# Patient Record
Sex: MALE | Race: WHITE | NOT HISPANIC OR LATINO | Employment: FULL TIME | URBAN - METROPOLITAN AREA
[De-identification: names, ages, dates, MRNs, and addresses within clinical notes are randomized per-mention and may not be internally consistent; named-entity substitution may affect disease eponyms.]

---

## 2022-06-06 ENCOUNTER — OFFICE VISIT (OUTPATIENT)
Dept: INTERNAL MEDICINE | Facility: CLINIC | Age: 52
End: 2022-06-06
Payer: COMMERCIAL

## 2022-06-06 VITALS
HEIGHT: 72 IN | BODY MASS INDEX: 35.48 KG/M2 | WEIGHT: 261.94 LBS | OXYGEN SATURATION: 96 % | TEMPERATURE: 97 F | HEART RATE: 81 BPM | SYSTOLIC BLOOD PRESSURE: 150 MMHG | DIASTOLIC BLOOD PRESSURE: 100 MMHG

## 2022-06-06 DIAGNOSIS — I10 PRIMARY HYPERTENSION: ICD-10-CM

## 2022-06-06 DIAGNOSIS — Z00.00 ROUTINE GENERAL MEDICAL EXAMINATION AT HEALTH CARE FACILITY: ICD-10-CM

## 2022-06-06 DIAGNOSIS — Z12.11 COLON CANCER SCREENING: ICD-10-CM

## 2022-06-06 DIAGNOSIS — M54.50 ACUTE BILATERAL LOW BACK PAIN WITHOUT SCIATICA: ICD-10-CM

## 2022-06-06 PROBLEM — E29.1 HYPOGONADISM IN MALE: Status: ACTIVE | Noted: 2022-06-06

## 2022-06-06 PROCEDURE — 3008F PR BODY MASS INDEX (BMI) DOCUMENTED: ICD-10-PCS | Mod: CPTII,S$GLB,, | Performed by: INTERNAL MEDICINE

## 2022-06-06 PROCEDURE — 3080F PR MOST RECENT DIASTOLIC BLOOD PRESSURE >= 90 MM HG: ICD-10-PCS | Mod: CPTII,S$GLB,, | Performed by: INTERNAL MEDICINE

## 2022-06-06 PROCEDURE — 1160F PR REVIEW ALL MEDS BY PRESCRIBER/CLIN PHARMACIST DOCUMENTED: ICD-10-PCS | Mod: CPTII,S$GLB,, | Performed by: INTERNAL MEDICINE

## 2022-06-06 PROCEDURE — 1159F MED LIST DOCD IN RCRD: CPT | Mod: CPTII,S$GLB,, | Performed by: INTERNAL MEDICINE

## 2022-06-06 PROCEDURE — 1160F RVW MEDS BY RX/DR IN RCRD: CPT | Mod: CPTII,S$GLB,, | Performed by: INTERNAL MEDICINE

## 2022-06-06 PROCEDURE — 3008F BODY MASS INDEX DOCD: CPT | Mod: CPTII,S$GLB,, | Performed by: INTERNAL MEDICINE

## 2022-06-06 PROCEDURE — 3077F PR MOST RECENT SYSTOLIC BLOOD PRESSURE >= 140 MM HG: ICD-10-PCS | Mod: CPTII,S$GLB,, | Performed by: INTERNAL MEDICINE

## 2022-06-06 PROCEDURE — 99999 PR PBB SHADOW E&M-NEW PATIENT-LVL IV: CPT | Mod: PBBFAC,,, | Performed by: INTERNAL MEDICINE

## 2022-06-06 PROCEDURE — 99999 PR PBB SHADOW E&M-NEW PATIENT-LVL IV: ICD-10-PCS | Mod: PBBFAC,,, | Performed by: INTERNAL MEDICINE

## 2022-06-06 PROCEDURE — 1159F PR MEDICATION LIST DOCUMENTED IN MEDICAL RECORD: ICD-10-PCS | Mod: CPTII,S$GLB,, | Performed by: INTERNAL MEDICINE

## 2022-06-06 PROCEDURE — 3080F DIAST BP >= 90 MM HG: CPT | Mod: CPTII,S$GLB,, | Performed by: INTERNAL MEDICINE

## 2022-06-06 PROCEDURE — 99386 PREV VISIT NEW AGE 40-64: CPT | Mod: S$GLB,,, | Performed by: INTERNAL MEDICINE

## 2022-06-06 PROCEDURE — 3077F SYST BP >= 140 MM HG: CPT | Mod: CPTII,S$GLB,, | Performed by: INTERNAL MEDICINE

## 2022-06-06 PROCEDURE — 99386 PR PREVENTIVE VISIT,NEW,40-64: ICD-10-PCS | Mod: S$GLB,,, | Performed by: INTERNAL MEDICINE

## 2022-06-06 RX ORDER — BACLOFEN 10 MG/1
10 TABLET ORAL NIGHTLY
Qty: 10 TABLET | Refills: 0 | Status: SHIPPED | OUTPATIENT
Start: 2022-06-06 | End: 2023-09-15

## 2022-06-06 RX ORDER — LOSARTAN POTASSIUM AND HYDROCHLOROTHIAZIDE 12.5; 5 MG/1; MG/1
1 TABLET ORAL DAILY
Qty: 90 TABLET | Refills: 3 | Status: SHIPPED | OUTPATIENT
Start: 2022-06-06 | End: 2022-06-13 | Stop reason: ALTCHOICE

## 2022-06-06 RX ORDER — TESTOSTERONE ENANTHATE 200 MG/ML
VIAL (ML) INTRAMUSCULAR
COMMUNITY
End: 2022-10-14

## 2022-06-06 RX ORDER — MELOXICAM 15 MG/1
15 TABLET ORAL DAILY
Qty: 30 TABLET | Refills: 0 | Status: SHIPPED | OUTPATIENT
Start: 2022-06-06 | End: 2022-07-06

## 2022-06-06 NOTE — PROGRESS NOTES
Subjective:       Patient ID: Marcus Peterson is a 51 y.o. male.    Chief Complaint: Establish Care    HPI Patient is a 51 year a is new patient.  He comes in today with history of hypertension and some low testosterone issues.  He states that he has a history of hypertension he was on medication for that with time but he is not take it the medication in quite a while.  He has noted his blood pressure may be running little bit.  He is going to be having some work done on his right eye by ophthalmologist in the near future and wanted make sure he got his blood pressure under control.    He states this morning when he was in the shower he will reach for a bar soap any twisted funny and his lower back got tweaked a little bit.  He states has given him some discomfort at this point which is likely also contributing to his elevation of his blood pressure today.  He describes lower back discomfort both sides in the lumbar area.  It does not radiate    Review of Systems   Constitutional: Negative for fever and unexpected weight change.   HENT: Negative for hearing loss, postnasal drip and rhinorrhea.    Eyes: Negative for pain and visual disturbance.   Respiratory: Negative for cough, shortness of breath and wheezing.    Cardiovascular: Negative for chest pain and palpitations.   Gastrointestinal: Negative for constipation, diarrhea, nausea and vomiting.   Genitourinary: Negative for dysuria and hematuria.   Musculoskeletal: Positive for back pain. Negative for arthralgias, myalgias and neck stiffness.   Skin: Negative for pallor and rash.   Neurological: Negative for seizures, syncope and headaches.   Hematological: Negative for adenopathy.   Psychiatric/Behavioral: Negative for dysphoric mood. The patient is not nervous/anxious.        Objective:   BP (!) 150/100   Pulse 81   Temp 96.8 °F (36 °C) (Temporal)   Ht 6' (1.829 m)   Wt 118.8 kg (261 lb 14.5 oz)   SpO2 96%   BMI 35.52 kg/m²      Physical Exam  Vitals  reviewed.   Constitutional:       General: He is not in acute distress.     Appearance: He is well-developed.   HENT:      Head: Normocephalic and atraumatic.      Right Ear: Tympanic membrane and ear canal normal.      Left Ear: Tympanic membrane and ear canal normal.   Eyes:      Pupils: Pupils are equal, round, and reactive to light.   Neck:      Thyroid: No thyromegaly.      Vascular: No JVD.   Cardiovascular:      Rate and Rhythm: Normal rate and regular rhythm.      Heart sounds: Normal heart sounds. No murmur heard.    No friction rub. No gallop.   Pulmonary:      Effort: Pulmonary effort is normal.      Breath sounds: Normal breath sounds. No wheezing or rales.   Abdominal:      General: Bowel sounds are normal. There is no distension.      Palpations: Abdomen is soft.      Tenderness: There is no abdominal tenderness. There is no guarding or rebound.   Musculoskeletal:         General: Normal range of motion.      Cervical back: Normal range of motion and neck supple.   Lymphadenopathy:      Cervical: No cervical adenopathy.   Skin:     General: Skin is warm and dry.      Findings: No rash.   Neurological:      General: No focal deficit present.      Mental Status: He is alert and oriented to person, place, and time.      Cranial Nerves: No cranial nerve deficit.      Deep Tendon Reflexes: Reflexes are normal and symmetric.   Psychiatric:         Mood and Affect: Mood normal.         Judgment: Judgment normal.         No visits with results within 2 Week(s) from this visit.   Latest known visit with results is:   No results found for any previous visit.       Assessment:       1. Routine general medical examination at [facility] facility    2. Primary hypertension    3. Acute bilateral low back pain without sciatica    4. Colon cancer screening        Plan:   No problem-specific Assessment & Plan notes found for this encounter.    Routine general medical examination at [facility]  facility  Comments:  Focus on good health habits, low salt, limit caffeine, continue regular exercise  Orders:  -     CBC Auto Differential; Future; Expected date: 06/06/2022  -     Comprehensive Metabolic Panel; Future; Expected date: 06/06/2022  -     Lipid Panel; Future; Expected date: 06/06/2022  -     PSA, Screening; Future; Expected date: 06/06/2022    Primary hypertension  Comments:  start losartan/hctz 50/12.5 once daily  Orders:  -     losartan-hydrochlorothiazide 50-12.5 mg (HYZAAR) 50-12.5 mg per tablet; Take 1 tablet by mouth once daily.  Dispense: 90 tablet; Refill: 3    Acute bilateral low back pain without sciatica  -     baclofen (LIORESAL) 10 MG tablet; Take 1 tablet (10 mg total) by mouth every evening. for 10 days  Dispense: 10 tablet; Refill: 0  -     meloxicam (MOBIC) 15 MG tablet; Take 1 tablet (15 mg total) by mouth once daily.  Dispense: 30 tablet; Refill: 0    Colon cancer screening  -     Ambulatory referral/consult to Endo Procedure ; Future; Expected date: 06/07/2022          Follow up in about 1 week (around 6/13/2022) for PreOp, with Rush Santo.

## 2022-06-10 ENCOUNTER — PATIENT MESSAGE (OUTPATIENT)
Dept: ADMINISTRATIVE | Facility: HOSPITAL | Age: 52
End: 2022-06-10
Payer: COMMERCIAL

## 2022-06-13 ENCOUNTER — LAB VISIT (OUTPATIENT)
Dept: LAB | Facility: HOSPITAL | Age: 52
End: 2022-06-13
Attending: INTERNAL MEDICINE
Payer: COMMERCIAL

## 2022-06-13 ENCOUNTER — OFFICE VISIT (OUTPATIENT)
Dept: INTERNAL MEDICINE | Facility: CLINIC | Age: 52
End: 2022-06-13
Payer: COMMERCIAL

## 2022-06-13 VITALS
DIASTOLIC BLOOD PRESSURE: 100 MMHG | SYSTOLIC BLOOD PRESSURE: 164 MMHG | BODY MASS INDEX: 34.34 KG/M2 | TEMPERATURE: 97 F | HEIGHT: 72 IN | HEART RATE: 78 BPM | WEIGHT: 253.5 LBS

## 2022-06-13 DIAGNOSIS — Z00.00 ROUTINE GENERAL MEDICAL EXAMINATION AT HEALTH CARE FACILITY: ICD-10-CM

## 2022-06-13 DIAGNOSIS — I10 PRIMARY HYPERTENSION: Primary | ICD-10-CM

## 2022-06-13 LAB
ALBUMIN SERPL BCP-MCNC: 3.9 G/DL (ref 3.5–5.2)
ALP SERPL-CCNC: 65 U/L (ref 55–135)
ALT SERPL W/O P-5'-P-CCNC: 84 U/L (ref 10–44)
ANION GAP SERPL CALC-SCNC: 13 MMOL/L (ref 8–16)
AST SERPL-CCNC: 40 U/L (ref 10–40)
BASOPHILS # BLD AUTO: 0.11 K/UL (ref 0–0.2)
BASOPHILS NFR BLD: 1.6 % (ref 0–1.9)
BILIRUB SERPL-MCNC: 1.3 MG/DL (ref 0.1–1)
BUN SERPL-MCNC: 40 MG/DL (ref 6–20)
CALCIUM SERPL-MCNC: 9.3 MG/DL (ref 8.7–10.5)
CHLORIDE SERPL-SCNC: 101 MMOL/L (ref 95–110)
CHOLEST SERPL-MCNC: 215 MG/DL (ref 120–199)
CHOLEST/HDLC SERPL: 10.8 {RATIO} (ref 2–5)
CO2 SERPL-SCNC: 27 MMOL/L (ref 23–29)
COMPLEXED PSA SERPL-MCNC: 2.4 NG/ML (ref 0–4)
CREAT SERPL-MCNC: 1.6 MG/DL (ref 0.5–1.4)
DIFFERENTIAL METHOD: ABNORMAL
EOSINOPHIL # BLD AUTO: 0.2 K/UL (ref 0–0.5)
EOSINOPHIL NFR BLD: 2.4 % (ref 0–8)
ERYTHROCYTE [DISTWIDTH] IN BLOOD BY AUTOMATED COUNT: 12.9 % (ref 11.5–14.5)
EST. GFR  (AFRICAN AMERICAN): 57.2 ML/MIN/1.73 M^2
EST. GFR  (NON AFRICAN AMERICAN): 49.5 ML/MIN/1.73 M^2
GLUCOSE SERPL-MCNC: 96 MG/DL (ref 70–110)
HCT VFR BLD AUTO: 55.3 % (ref 40–54)
HDLC SERPL-MCNC: 20 MG/DL (ref 40–75)
HDLC SERPL: 9.3 % (ref 20–50)
HGB BLD-MCNC: 18.3 G/DL (ref 14–18)
IMM GRANULOCYTES # BLD AUTO: 0.33 K/UL (ref 0–0.04)
IMM GRANULOCYTES NFR BLD AUTO: 4.7 % (ref 0–0.5)
LDLC SERPL CALC-MCNC: 155.4 MG/DL (ref 63–159)
LYMPHOCYTES # BLD AUTO: 1.4 K/UL (ref 1–4.8)
LYMPHOCYTES NFR BLD: 20.7 % (ref 18–48)
MCH RBC QN AUTO: 30.5 PG (ref 27–31)
MCHC RBC AUTO-ENTMCNC: 33.1 G/DL (ref 32–36)
MCV RBC AUTO: 92 FL (ref 82–98)
MONOCYTES # BLD AUTO: 0.8 K/UL (ref 0.3–1)
MONOCYTES NFR BLD: 11.9 % (ref 4–15)
NEUTROPHILS # BLD AUTO: 4.1 K/UL (ref 1.8–7.7)
NEUTROPHILS NFR BLD: 58.7 % (ref 38–73)
NONHDLC SERPL-MCNC: 195 MG/DL
NRBC BLD-RTO: 0 /100 WBC
PLATELET # BLD AUTO: 192 K/UL (ref 150–450)
PMV BLD AUTO: 11.6 FL (ref 9.2–12.9)
POTASSIUM SERPL-SCNC: 3.9 MMOL/L (ref 3.5–5.1)
PROT SERPL-MCNC: 6.8 G/DL (ref 6–8.4)
RBC # BLD AUTO: 6 M/UL (ref 4.6–6.2)
SODIUM SERPL-SCNC: 141 MMOL/L (ref 136–145)
TRIGL SERPL-MCNC: 198 MG/DL (ref 30–150)
WBC # BLD AUTO: 6.96 K/UL (ref 3.9–12.7)

## 2022-06-13 PROCEDURE — 85025 COMPLETE CBC W/AUTO DIFF WBC: CPT | Performed by: INTERNAL MEDICINE

## 2022-06-13 PROCEDURE — 1159F MED LIST DOCD IN RCRD: CPT | Mod: CPTII,S$GLB,, | Performed by: INTERNAL MEDICINE

## 2022-06-13 PROCEDURE — 1160F PR REVIEW ALL MEDS BY PRESCRIBER/CLIN PHARMACIST DOCUMENTED: ICD-10-PCS | Mod: CPTII,S$GLB,, | Performed by: INTERNAL MEDICINE

## 2022-06-13 PROCEDURE — 99999 PR PBB SHADOW E&M-EST. PATIENT-LVL III: CPT | Mod: PBBFAC,,, | Performed by: INTERNAL MEDICINE

## 2022-06-13 PROCEDURE — 84153 ASSAY OF PSA TOTAL: CPT | Performed by: INTERNAL MEDICINE

## 2022-06-13 PROCEDURE — 3080F DIAST BP >= 90 MM HG: CPT | Mod: CPTII,S$GLB,, | Performed by: INTERNAL MEDICINE

## 2022-06-13 PROCEDURE — 3080F PR MOST RECENT DIASTOLIC BLOOD PRESSURE >= 90 MM HG: ICD-10-PCS | Mod: CPTII,S$GLB,, | Performed by: INTERNAL MEDICINE

## 2022-06-13 PROCEDURE — 80061 LIPID PANEL: CPT | Performed by: INTERNAL MEDICINE

## 2022-06-13 PROCEDURE — 80053 COMPREHEN METABOLIC PANEL: CPT | Performed by: INTERNAL MEDICINE

## 2022-06-13 PROCEDURE — 1160F RVW MEDS BY RX/DR IN RCRD: CPT | Mod: CPTII,S$GLB,, | Performed by: INTERNAL MEDICINE

## 2022-06-13 PROCEDURE — 99213 PR OFFICE/OUTPT VISIT, EST, LEVL III, 20-29 MIN: ICD-10-PCS | Mod: S$GLB,,, | Performed by: INTERNAL MEDICINE

## 2022-06-13 PROCEDURE — 1159F PR MEDICATION LIST DOCUMENTED IN MEDICAL RECORD: ICD-10-PCS | Mod: CPTII,S$GLB,, | Performed by: INTERNAL MEDICINE

## 2022-06-13 PROCEDURE — 3077F SYST BP >= 140 MM HG: CPT | Mod: CPTII,S$GLB,, | Performed by: INTERNAL MEDICINE

## 2022-06-13 PROCEDURE — 3008F BODY MASS INDEX DOCD: CPT | Mod: CPTII,S$GLB,, | Performed by: INTERNAL MEDICINE

## 2022-06-13 PROCEDURE — 36415 COLL VENOUS BLD VENIPUNCTURE: CPT | Mod: PO | Performed by: INTERNAL MEDICINE

## 2022-06-13 PROCEDURE — 99999 PR PBB SHADOW E&M-EST. PATIENT-LVL III: ICD-10-PCS | Mod: PBBFAC,,, | Performed by: INTERNAL MEDICINE

## 2022-06-13 PROCEDURE — 99213 OFFICE O/P EST LOW 20 MIN: CPT | Mod: S$GLB,,, | Performed by: INTERNAL MEDICINE

## 2022-06-13 PROCEDURE — 3077F PR MOST RECENT SYSTOLIC BLOOD PRESSURE >= 140 MM HG: ICD-10-PCS | Mod: CPTII,S$GLB,, | Performed by: INTERNAL MEDICINE

## 2022-06-13 PROCEDURE — 3008F PR BODY MASS INDEX (BMI) DOCUMENTED: ICD-10-PCS | Mod: CPTII,S$GLB,, | Performed by: INTERNAL MEDICINE

## 2022-06-13 RX ORDER — LOSARTAN POTASSIUM AND HYDROCHLOROTHIAZIDE 25; 100 MG/1; MG/1
1 TABLET ORAL DAILY
Qty: 90 TABLET | Refills: 3
Start: 2022-06-13 | End: 2022-07-22 | Stop reason: SDUPTHER

## 2022-06-13 NOTE — PROGRESS NOTES
Subjective:       Patient ID: Marcus Peterson is a 51 y.o. male.    Chief Complaint: Pre-op Exam    HPI  patient presents for follow-up on his blood pressure and potential perioperative risk assessment.  Patient was seen last week and was noted to have elevated blood pressure readings.  He was started on losartan  HCT 50/12.5 once daily.  He has been taking medication since last week.  He comes in following up.  He does relate that he had an episode of gastroenteritis last week between his visits.  He had some diarrhea for a couple of days he is feeling back to normal at this time.  Review of his blood pressures show that he has not had a significant improvement with blood pressure readings at this time.  Continues to run 160/100.  His goal had been to have the blood pressures improve so he could proceed with a surgery planned on his eye for tomorrow.  We discussed today that given his blood pressure readings would not recommend he proceed with surgery at this time.  He expressed understanding of that.    Objective:   BP (!) 164/100   Pulse 78   Temp 97.2 °F (36.2 °C)   Ht 6' (1.829 m)   Wt 115 kg (253 lb 8.5 oz)   BMI 34.38 kg/m²      Physical Exam  Constitutional:       General: He is not in acute distress.     Appearance: He is well-developed.   HENT:      Head: Normocephalic and atraumatic.   Pulmonary:      Effort: Pulmonary effort is normal. No respiratory distress.   Musculoskeletal:      Cervical back: Normal range of motion.   Skin:     Findings: No rash.   Neurological:      Mental Status: He is alert and oriented to person, place, and time.      Cranial Nerves: No cranial nerve deficit.   Psychiatric:         Behavior: Behavior normal.         Thought Content: Thought content normal.         No visits with results within 2 Week(s) from this visit.   Latest known visit with results is:   No results found for any previous visit.       Assessment:       1. Primary hypertension        Plan:   No  problem-specific Assessment & Plan notes found for this encounter.    Primary hypertension  Comments:  Increase losartan hctz to 100 /25.  Follow up in 10 days for recheck.    Other orders  -     losartan-hydrochlorothiazide 100-25 mg (HYZAAR) 100-25 mg per tablet; Take 1 tablet by mouth once daily.  Dispense: 90 tablet; Refill: 3    The again increases losartan HCT to 100/25.  We will have him back in 10 days for recheck.  His blood pressure is under better control at that time we can fill out paper work and work on getting his surgery approved.  If he has not seen significant improvement at time we will need to look at the addition of further medications.  He expresses understanding of plan.      Follow up in about 10 weeks (around 8/22/2022) for HTN, Preop for eye surgery, with Rush Santo.

## 2022-06-14 DIAGNOSIS — R79.89 ELEVATED SERUM CREATININE: ICD-10-CM

## 2022-06-14 DIAGNOSIS — D75.1 POLYCYTHEMIA: Primary | ICD-10-CM

## 2022-06-27 ENCOUNTER — OFFICE VISIT (OUTPATIENT)
Dept: INTERNAL MEDICINE | Facility: CLINIC | Age: 52
End: 2022-06-27
Payer: COMMERCIAL

## 2022-06-27 VITALS
SYSTOLIC BLOOD PRESSURE: 138 MMHG | HEIGHT: 72 IN | WEIGHT: 253.06 LBS | TEMPERATURE: 98 F | DIASTOLIC BLOOD PRESSURE: 88 MMHG | HEART RATE: 80 BPM | BODY MASS INDEX: 34.28 KG/M2 | OXYGEN SATURATION: 98 %

## 2022-06-27 DIAGNOSIS — Z01.818 ENCOUNTER FOR PRE-OPERATIVE EXAMINATION: ICD-10-CM

## 2022-06-27 DIAGNOSIS — I10 PRIMARY HYPERTENSION: Primary | ICD-10-CM

## 2022-06-27 PROCEDURE — 99999 PR PBB SHADOW E&M-EST. PATIENT-LVL IV: CPT | Mod: PBBFAC,,, | Performed by: NURSE PRACTITIONER

## 2022-06-27 PROCEDURE — 1159F PR MEDICATION LIST DOCUMENTED IN MEDICAL RECORD: ICD-10-PCS | Mod: CPTII,S$GLB,, | Performed by: NURSE PRACTITIONER

## 2022-06-27 PROCEDURE — 3079F PR MOST RECENT DIASTOLIC BLOOD PRESSURE 80-89 MM HG: ICD-10-PCS | Mod: CPTII,S$GLB,, | Performed by: NURSE PRACTITIONER

## 2022-06-27 PROCEDURE — 3079F DIAST BP 80-89 MM HG: CPT | Mod: CPTII,S$GLB,, | Performed by: NURSE PRACTITIONER

## 2022-06-27 PROCEDURE — 99213 OFFICE O/P EST LOW 20 MIN: CPT | Mod: S$GLB,,, | Performed by: NURSE PRACTITIONER

## 2022-06-27 PROCEDURE — 99999 PR PBB SHADOW E&M-EST. PATIENT-LVL IV: ICD-10-PCS | Mod: PBBFAC,,, | Performed by: NURSE PRACTITIONER

## 2022-06-27 PROCEDURE — 1159F MED LIST DOCD IN RCRD: CPT | Mod: CPTII,S$GLB,, | Performed by: NURSE PRACTITIONER

## 2022-06-27 PROCEDURE — 3075F PR MOST RECENT SYSTOLIC BLOOD PRESS GE 130-139MM HG: ICD-10-PCS | Mod: CPTII,S$GLB,, | Performed by: NURSE PRACTITIONER

## 2022-06-27 PROCEDURE — 99213 PR OFFICE/OUTPT VISIT, EST, LEVL III, 20-29 MIN: ICD-10-PCS | Mod: S$GLB,,, | Performed by: NURSE PRACTITIONER

## 2022-06-27 PROCEDURE — 3075F SYST BP GE 130 - 139MM HG: CPT | Mod: CPTII,S$GLB,, | Performed by: NURSE PRACTITIONER

## 2022-06-27 PROCEDURE — 3008F PR BODY MASS INDEX (BMI) DOCUMENTED: ICD-10-PCS | Mod: CPTII,S$GLB,, | Performed by: NURSE PRACTITIONER

## 2022-06-27 PROCEDURE — 3008F BODY MASS INDEX DOCD: CPT | Mod: CPTII,S$GLB,, | Performed by: NURSE PRACTITIONER

## 2022-06-27 NOTE — PROGRESS NOTES
Subjective:       Patient ID: Marcus Peterson is a 51 y.o. male.    Chief Complaint: Follow-up    Taking Losartan/HCTZ 100/25 daily. Has been on it for a few weeks. BP still remains high.      /88   Pulse 80   Temp 98 °F (36.7 °C) (Temporal)   Ht 6' (1.829 m)   Wt 114.8 kg (253 lb 1.4 oz)   SpO2 98%   BMI 34.32 kg/m²     Review of Systems   Constitutional: Negative for appetite change, chills, diaphoresis, fever and unexpected weight change.   HENT: Negative for congestion, ear pain, nosebleeds, postnasal drip, rhinorrhea, sinus pressure, sneezing, sore throat and trouble swallowing.    Eyes: Negative for photophobia, pain and visual disturbance.   Respiratory: Negative for apnea, cough, choking, chest tightness, shortness of breath and wheezing.    Cardiovascular: Negative for chest pain, palpitations and leg swelling.   Gastrointestinal: Negative for abdominal pain, blood in stool, constipation, diarrhea, nausea and vomiting.   Genitourinary: Negative for decreased urine volume, difficulty urinating, dysuria, hematuria and urgency.   Musculoskeletal: Negative for arthralgias, gait problem, joint swelling and myalgias.   Skin: Negative for rash.   Neurological: Negative for dizziness, tremors, seizures, syncope, weakness, light-headedness, numbness and headaches.   Psychiatric/Behavioral: Negative for agitation, confusion, decreased concentration, hallucinations and sleep disturbance. The patient is not nervous/anxious.        Objective:      Physical Exam  Vitals and nursing note reviewed.   Constitutional:       General: He is not in acute distress.     Appearance: He is well-developed. He is not diaphoretic.   HENT:      Head: Normocephalic and atraumatic.   Eyes:      General:         Right eye: No discharge.         Left eye: No discharge.      Conjunctiva/sclera: Conjunctivae normal.   Cardiovascular:      Rate and Rhythm: Normal rate and regular rhythm.      Heart sounds: Normal heart sounds. No  murmur heard.  Pulmonary:      Effort: Pulmonary effort is normal. No respiratory distress.      Breath sounds: Normal breath sounds. No wheezing or rales.   Chest:      Chest wall: No tenderness.   Abdominal:      General: There is no distension.      Palpations: Abdomen is soft.   Musculoskeletal:         General: Normal range of motion.   Skin:     General: Skin is warm and dry.      Findings: No rash.   Neurological:      Mental Status: He is alert and oriented to person, place, and time.   Psychiatric:         Behavior: Behavior normal. Behavior is cooperative.         Thought Content: Thought content normal.         Judgment: Judgment normal.         Assessment:       1. Primary hypertension    2. BMI 34.0-34.9,adult        Plan:       Marcus was seen today for follow-up.    Diagnoses and all orders for this visit:    Primary hypertension    BMI 34.0-34.9,adult      There are no Patient Instructions on file for this visit.

## 2022-06-27 NOTE — PROGRESS NOTES
Subjective:     Marcus Peterson is a 51 y.o. male who presents to the office today for a preoperative consultation at the request of surgeon Dr. frost who plans on performing eye surgery on TBA unknown at this time. This consultation is requested for the specific conditions prompting preoperative evaluation (i.e. because of potential affect on operative risk). Planned anesthesia: local/MAC. The patient has the following known anesthesia issues: past regional anesthesia with complications (none). Patients bleeding risk: no recent abnormal bleeding. Patient does not have objections to receiving blood products if needed.      He is also here to follow up on his BP. Pt reports compliance with his BP meds (Losartan/HCTZ 100/25 mg) q am. Pt does not check BP at home but overall is feeling good. At initial arrival, pts bp was 150/102. After sitting for about 10 mins, BP rechecked at 138/88. Advised pt compliance with BP meds and advised home BP checked. Discussed with pt to take his BP meds early the day of surgery so his BP does not stop the procedure. Pt verbalized understanding.       The following portions of the patient's history were reviewed and updated as appropriate:  Past Medical History:   Diagnosis Date    Hypertension     Hypogonadism in male      Past Medical History:   Diagnosis Date    Hypertension     Hypogonadism in male      Family History   Problem Relation Age of Onset    Arthritis Mother     Depression Mother     Hypertension Mother     Aneurysm Mother     Hypertension Father     Cancer Paternal Grandmother     Muscular dystrophy Paternal Grandfather      Past Surgical History:   Procedure Laterality Date    KNEE SURGERY       Social History     Socioeconomic History    Marital status:     Number of children: 3   Occupational History    Occupation:    Tobacco Use    Smoking status: Former Smoker     Packs/day: 0.50     Years: 5.00     Pack years: 2.50     Types:  Cigarettes     Quit date:      Years since quittin.4    Smokeless tobacco: Never Used   Substance and Sexual Activity    Alcohol use: Yes     Alcohol/week: 10.0 standard drinks     Types: 10 Glasses of wine per week    Drug use: Never    Sexual activity: Yes     Partners: Female     Review of patient's allergies indicates:  No Known Allergies  Medication List with Changes/Refills   Current Medications    BACLOFEN (LIORESAL) 10 MG TABLET    Take 1 tablet (10 mg total) by mouth every evening. for 10 days    LOSARTAN-HYDROCHLOROTHIAZIDE 100-25 MG (HYZAAR) 100-25 MG PER TABLET    Take 1 tablet by mouth once daily.    MELOXICAM (MOBIC) 15 MG TABLET    Take 1 tablet (15 mg total) by mouth once daily.    TESTOSTERONE ENANTHATE (DELATESTRYL) 200 MG/ML INJECTION    Inject into the muscle every 14 (fourteen) days.     Patient Active Problem List   Diagnosis    Hypogonadism in male    Hypertension       Review of Systems  Review of Systems   Constitutional: Negative for appetite change, chills, diaphoresis, fever and unexpected weight change.   HENT: Negative for congestion, ear pain, nosebleeds, postnasal drip, rhinorrhea, sinus pressure, sneezing, sore throat and trouble swallowing.    Eyes: Negative for photophobia, pain and visual disturbance.   Respiratory: Negative for apnea, cough, choking, chest tightness, shortness of breath and wheezing.    Cardiovascular: Negative for chest pain, palpitations and leg swelling.   Gastrointestinal: Negative for abdominal pain, blood in stool, constipation, diarrhea, nausea and vomiting.   Genitourinary: Negative for decreased urine volume, difficulty urinating, dysuria, hematuria and urgency.   Musculoskeletal: Negative for arthralgias, gait problem, joint swelling and myalgias.   Skin: Negative for rash.   Neurological: Negative for dizziness, tremors, seizures, syncope, weakness, light-headedness, numbness and headaches.   Psychiatric/Behavioral: Negative for  agitation, confusion, decreased concentration, hallucinations and sleep disturbance. The patient is not nervous/anxious.         Objective:     Vitals:    06/27/22 0945   BP: 138/88   Pulse:    Temp:        Physical Exam  Vitals and nursing note reviewed.   Constitutional:       General: He is not in acute distress.     Appearance: He is well-developed. He is not diaphoretic.   HENT:      Head: Normocephalic and atraumatic.   Eyes:      General:         Right eye: No discharge.         Left eye: No discharge.      Conjunctiva/sclera: Conjunctivae normal.   Cardiovascular:      Rate and Rhythm: Normal rate and regular rhythm.      Heart sounds: Normal heart sounds. No murmur heard.  Pulmonary:      Effort: Pulmonary effort is normal. No respiratory distress.      Breath sounds: Normal breath sounds. No wheezing or rales.   Chest:      Chest wall: No tenderness.   Abdominal:      General: There is no distension.      Palpations: Abdomen is soft.   Musculoskeletal:         General: Normal range of motion.   Skin:     General: Skin is warm and dry.      Findings: No rash.   Neurological:      Mental Status: He is alert and oriented to person, place, and time.   Psychiatric:         Behavior: Behavior normal. Behavior is cooperative.         Thought Content: Thought content normal.         Judgment: Judgment normal.          Assessment:       Encounter Diagnoses   Name Primary?    Primary hypertension Yes    Encounter for pre-operative examination     BMI 34.0-34.9,adult         Plan:     Primary hypertension  Comments:  stable, conitnue BP meds, diet and lifestyle discussed. Aim for weight loss. Home BP monitoring    Encounter for pre-operative examination    BMI 34.0-34.9,adult        I reviewed the patient's past medical, surgical, social and family history and with  physical exam findings and the proposed surgery and I make the following recommendations:     From a cardiac standpoint the patient is low risk for  surgery with a low risk surgery. Patient has no evidence of cardiac symptomatology or cardiac diagnoses. The patient may proceed with surgery without further cardiac workup.     The patient has been instructed to take blood pressure medication the morning of surgery with a sip of water. Avoid any aspirin or anti-inflammatories between now and surgery.     If there is any further I can do to assist in the care of this patient please not hesitate to contact me. I will forward the lab results upon my receipt.    Rush Santo NP

## 2022-07-28 ENCOUNTER — TELEPHONE (OUTPATIENT)
Dept: INTERNAL MEDICINE | Facility: CLINIC | Age: 52
End: 2022-07-28
Payer: COMMERCIAL

## 2022-07-28 NOTE — TELEPHONE ENCOUNTER
----- Message from Pippa Diallo sent at 7/28/2022  4:08 PM CDT -----  Contact: Cori([facility])  Allison called to consult with nurse or staff regarding a verbal authorization for the patients prescription. She states she wasn't sure which providers office she needed to reach out to and wanted to speak with someone if possible. She states the patient is completely out of medication and [place]. Cori would like a call back and [phone]. Thanks/Mr

## 2022-07-29 ENCOUNTER — HOSPITAL ENCOUNTER (OUTPATIENT)
Dept: PREADMISSION TESTING | Facility: HOSPITAL | Age: 52
Discharge: HOME | End: 2022-07-29
Attending: INTERNAL MEDICINE
Payer: COMMERCIAL

## 2022-07-29 DIAGNOSIS — Z12.11 COLON CANCER SCREENING: Primary | ICD-10-CM

## 2022-07-29 RX ORDER — SODIUM, POTASSIUM,MAG SULFATES 17.5-3.13G
1 SOLUTION, RECONSTITUTED, ORAL ORAL DAILY
Qty: 1 KIT | Refills: 0 | Status: SHIPPED | OUTPATIENT
Start: 2022-07-29 | End: 2022-07-31

## 2022-08-03 ENCOUNTER — PATIENT MESSAGE (OUTPATIENT)
Dept: INTERNAL MEDICINE | Facility: CLINIC | Age: 52
End: 2022-08-03
Payer: COMMERCIAL

## 2022-08-03 RX ORDER — AMLODIPINE BESYLATE 5 MG/1
5 TABLET ORAL DAILY
Qty: 30 TABLET | Refills: 11 | Status: SHIPPED | OUTPATIENT
Start: 2022-08-03 | End: 2022-09-19 | Stop reason: DRUGHIGH

## 2022-09-13 ENCOUNTER — ANESTHESIA EVENT (OUTPATIENT)
Dept: ENDOSCOPY | Facility: HOSPITAL | Age: 52
End: 2022-09-13
Payer: COMMERCIAL

## 2022-09-13 NOTE — ANESTHESIA PREPROCEDURE EVALUATION
09/13/2022  Marcus Peterson is a 51 y.o., male.  Past Medical History:   Diagnosis Date    Hypertension     Hypogonadism in male      Past Surgical History:   Procedure Laterality Date    KNEE SURGERY       PCP 6/22 Pre-op Clearance for EYE Surgery  Primary hypertension  Comments:  stable, conitnue BP meds, diet and lifestyle discussed. Aim for weight loss. Home BP monitoring     Encounter for pre-operative examination     BMI 34.0-34.9,adult           I reviewed the patient's past medical, surgical, social and family history and with  physical exam findings and the proposed surgery and I make the following recommendations:      From a cardiac standpoint the patient is low risk for surgery with a low risk surgery. Patient has no evidence of cardiac symptomatology or cardiac diagnoses. The patient may proceed with surgery without further cardiac workup.      The patient has been instructed to take blood pressure medication the morning of surgery with a sip of water. Avoid any aspirin or anti-inflammatories between now and surgery.      If there is any further I can do to assist in the care of this patient please not hesitate to contact me. I will forward the lab results upon my receipt.     Rush Santo NP       Pre-op Assessment    I have reviewed the Patient Summary Reports.     I have reviewed the Nursing Notes.       Review of Systems  Anesthesia Hx:  No problems with previous Anesthesia  Denies Family Hx of Anesthesia complications.   Denies Personal Hx of Anesthesia complications.   Social:  Former Smoker, Social Alcohol Use    Cardiovascular:   Exercise tolerance: good Hypertension Denies MI.   Denies Dysrhythmias.   Denies Angina.    Pulmonary:  Pulmonary Normal    Renal/:  Renal/ Normal     Hepatic/GI:  Hepatic/GI Normal    Neurological:  Neurology Normal    Endocrine:  Endocrine Normal         Physical Exam  General: Well nourished, Cooperative, Alert and Oriented    Airway:  Mallampati: III   Mouth Opening: Normal  TM Distance: 4 - 6 cm  Tongue: Normal  Neck ROM: Normal ROM    Dental:  Intact    Chest/Lungs:  Clear to auscultation, Normal Respiratory Rate    Heart:  Rate: Normal  Rhythm: Regular Rhythm      Wt Readings from Last 3 Encounters:   09/16/22 115.8 kg (255 lb 2.9 oz)   06/27/22 114.8 kg (253 lb 1.4 oz)   06/13/22 115 kg (253 lb 8.5 oz)     Temp Readings from Last 3 Encounters:   09/16/22 36.5 °C (97.7 °F) (Temporal)   06/27/22 36.7 °C (98 °F) (Temporal)   06/13/22 36.2 °C (97.2 °F)     BP Readings from Last 3 Encounters:   09/16/22 (!) 161/105   06/27/22 138/88   06/13/22 (!) 164/100     Pulse Readings from Last 3 Encounters:   09/16/22 75   06/27/22 80   06/13/22 78     9/16/2022 COVID negative    Anesthesia Plan  Type of Anesthesia, risks & benefits discussed:    Anesthesia Type: Gen Natural Airway, MAC  Intra-op Monitoring Plan: Standard ASA Monitors  Post Op Pain Control Plan: multimodal analgesia  Induction:  IV  Informed Consent: Informed consent signed with the Patient and all parties understand the risks and agree with anesthesia plan.  All questions answered.   ASA Score: 2  Day of Surgery Review of History & Physical: H&P Update referred to the surgeon/provider.    Ready For Surgery From Anesthesia Perspective.     .

## 2022-09-16 ENCOUNTER — ANESTHESIA (OUTPATIENT)
Dept: ENDOSCOPY | Facility: HOSPITAL | Age: 52
End: 2022-09-16
Payer: COMMERCIAL

## 2022-09-16 ENCOUNTER — HOSPITAL ENCOUNTER (OUTPATIENT)
Facility: HOSPITAL | Age: 52
Discharge: HOME | End: 2022-09-16
Attending: INTERNAL MEDICINE | Admitting: INTERNAL MEDICINE
Payer: COMMERCIAL

## 2022-09-16 ENCOUNTER — PATIENT MESSAGE (OUTPATIENT)
Dept: INTERNAL MEDICINE | Facility: CLINIC | Age: 52
End: 2022-09-16
Payer: COMMERCIAL

## 2022-09-16 VITALS
SYSTOLIC BLOOD PRESSURE: 120 MMHG | HEART RATE: 69 BPM | TEMPERATURE: 98 F | BODY MASS INDEX: 34.56 KG/M2 | WEIGHT: 255.19 LBS | HEIGHT: 72 IN | DIASTOLIC BLOOD PRESSURE: 81 MMHG | RESPIRATION RATE: 14 BRPM | OXYGEN SATURATION: 94 %

## 2022-09-16 DIAGNOSIS — Z12.11 ENCOUNTER FOR SCREENING COLONOSCOPY: Primary | ICD-10-CM

## 2022-09-16 LAB
CTP QC/QA: YES
SARS-COV-2 AG RESP QL IA.RAPID: NEGATIVE

## 2022-09-16 PROCEDURE — 63600175 PHARM REV CODE 636 W HCPCS: Performed by: INTERNAL MEDICINE

## 2022-09-16 PROCEDURE — 37000009 HC ANESTHESIA EA ADD 15 MINS: Performed by: INTERNAL MEDICINE

## 2022-09-16 PROCEDURE — 88305 TISSUE EXAM BY PATHOLOGIST: CPT | Mod: 26,,, | Performed by: PATHOLOGY

## 2022-09-16 PROCEDURE — 37000008 HC ANESTHESIA 1ST 15 MINUTES: Performed by: INTERNAL MEDICINE

## 2022-09-16 PROCEDURE — 63600175 PHARM REV CODE 636 W HCPCS: Performed by: NURSE ANESTHETIST, CERTIFIED REGISTERED

## 2022-09-16 PROCEDURE — D9220A PRA ANESTHESIA: Mod: 33,CRNA,, | Performed by: NURSE ANESTHETIST, CERTIFIED REGISTERED

## 2022-09-16 PROCEDURE — 88305 TISSUE EXAM BY PATHOLOGIST: ICD-10-PCS | Mod: 26,,, | Performed by: PATHOLOGY

## 2022-09-16 PROCEDURE — 27201089 HC SNARE, DISP (ANY): Performed by: INTERNAL MEDICINE

## 2022-09-16 PROCEDURE — D9220A PRA ANESTHESIA: Mod: 33,ANES,, | Performed by: ANESTHESIOLOGY

## 2022-09-16 PROCEDURE — D9220A PRA ANESTHESIA: ICD-10-PCS | Mod: 33,CRNA,, | Performed by: NURSE ANESTHETIST, CERTIFIED REGISTERED

## 2022-09-16 PROCEDURE — D9220A PRA ANESTHESIA: ICD-10-PCS | Mod: 33,ANES,, | Performed by: ANESTHESIOLOGY

## 2022-09-16 PROCEDURE — 45385 COLONOSCOPY W/LESION REMOVAL: CPT | Mod: PT | Performed by: INTERNAL MEDICINE

## 2022-09-16 PROCEDURE — 88305 TISSUE EXAM BY PATHOLOGIST: CPT | Performed by: PATHOLOGY

## 2022-09-16 PROCEDURE — 45385 COLONOSCOPY W/LESION REMOVAL: CPT | Mod: 33,,, | Performed by: INTERNAL MEDICINE

## 2022-09-16 PROCEDURE — 45385 PR COLONOSCOPY,REMV LESN,SNARE: ICD-10-PCS | Mod: 33,,, | Performed by: INTERNAL MEDICINE

## 2022-09-16 RX ORDER — SODIUM CHLORIDE, SODIUM LACTATE, POTASSIUM CHLORIDE, CALCIUM CHLORIDE 600; 310; 30; 20 MG/100ML; MG/100ML; MG/100ML; MG/100ML
INJECTION, SOLUTION INTRAVENOUS CONTINUOUS
Status: ACTIVE | OUTPATIENT
Start: 2022-09-16

## 2022-09-16 RX ORDER — PROPOFOL 10 MG/ML
VIAL (ML) INTRAVENOUS
Status: DISCONTINUED | OUTPATIENT
Start: 2022-09-16 | End: 2022-09-16

## 2022-09-16 RX ORDER — LIDOCAINE HCL/PF 100 MG/5ML
SYRINGE (ML) INTRAVENOUS
Status: DISCONTINUED | OUTPATIENT
Start: 2022-09-16 | End: 2022-09-16

## 2022-09-16 RX ADMIN — PROPOFOL 50 MG: 10 INJECTION, EMULSION INTRAVENOUS at 12:09

## 2022-09-16 RX ADMIN — SODIUM CHLORIDE, SODIUM LACTATE, POTASSIUM CHLORIDE, AND CALCIUM CHLORIDE: .6; .31; .03; .02 INJECTION, SOLUTION INTRAVENOUS at 11:09

## 2022-09-16 RX ADMIN — Medication 20 MG: at 12:09

## 2022-09-16 NOTE — DISCHARGE SUMMARY
[facility] - Endoscopy 1st Fl  Discharge Note  Short Stay    Procedure(s) (LRB):  COLONOSCOPY (N/A)    OUTCOME: Patient tolerated treatment/procedure well without complication and is now ready for discharge.    DISPOSITION: Home or Self Care    FINAL DIAGNOSIS:  Encounter for screening colonoscopy    FOLLOWUP: With primary care provider    DISCHARGE INSTRUCTIONS:  No discharge procedures on file.

## 2022-09-16 NOTE — ANESTHESIA POSTPROCEDURE EVALUATION
Anesthesia Post Evaluation    Patient: Marcus Peterson    Procedure(s) Performed: Procedure(s) (LRB):  COLONOSCOPY (N/A)    Final Anesthesia Type: general      Patient location during evaluation: GI PACU  Patient participation: Yes- Able to Participate  Level of consciousness: awake and alert  Post-procedure vital signs: reviewed and stable  Pain management: adequate  Airway patency: patent    PONV status at discharge: No PONV  Anesthetic complications: no      Cardiovascular status: hemodynamically stable  Respiratory status: unassisted and spontaneous ventilation  Hydration status: euvolemic  Follow-up not needed.      Pt did well during procedure and recovery. Asymptomatic. Advised pt to continue monitoring his BP and to follow up with his doctor for better control of his blood pressure. Pt states his understanding.    Vitals Value Taken Time   /81 09/16/22 1237   Temp 36.4 °C (97.5 °F) 09/16/22 1237   Pulse 69 09/16/22 1237   Resp 14 09/16/22 1237   SpO2 94 % 09/16/22 1237   Vitals shown include unvalidated device data.      Event Time   Out of Recovery 12:50:00         Pain/Harshal Score: Harshal Score: 10 (9/16/2022 12:20 PM)

## 2022-09-16 NOTE — TRANSFER OF CARE
Anesthesia Transfer of Care Note    Patient: Marcus Peterson    Procedure(s) Performed: Procedure(s) (LRB):  COLONOSCOPY (N/A)    Patient location: PACU    Anesthesia Type: general    Transport from OR: Transported from OR on room air with adequate spontaneous ventilation    Post pain: adequate analgesia    Post assessment: no apparent anesthetic complications and tolerated procedure well    Post vital signs: stable    Level of consciousness: awake, alert and oriented    Nausea/Vomiting: no nausea/vomiting    Complications: none    Transfer of care protocol was followed      Last vitals:   Visit Vitals  BP (!) 161/105 (BP Location: Left arm, Patient Position: Sitting)   Pulse 75   Temp 36.5 °C (97.7 °F) (Temporal)   Resp 18   Ht 6' (1.829 m)   Wt 115.8 kg (255 lb 2.9 oz)   SpO2 97%   BMI 34.61 kg/m²

## 2022-09-16 NOTE — PROVATION PATIENT INSTRUCTIONS
Discharge Summary/Instructions after an Endoscopic Procedure  Patient Name: Marcus Peterson  Patient MRN: [id number]  Patient YOB: 1970 Friday, September 16, 2022  Marcia Acevedo MD  Dear patient,  As a result of recent federal legislation (The Federal Cures Act), you may   receive lab or pathology results from your procedure in your [facility]   account before your physician is able to contact you. Your physician or   their representative will relay the results to you with their   recommendations at their soonest availability.  Thank you,  RESTRICTIONS:  During your procedure today, you received medications for sedation.  These   medications may affect your judgment, balance and coordination.  Therefore,   for 24 hours, you have the following restrictions:   - DO NOT drive a car, operate machinery, make legal/financial decisions,   sign important papers or drink alcohol.    ACTIVITY:  Today: no heavy lifting, straining or running due to procedural   sedation/anesthesia.  The following day: return to full activity including work.  DIET:  Eat and drink normally unless instructed otherwise.     TREATMENT FOR COMMON SIDE EFFECTS:  - Mild abdominal pain, nausea, belching, bloating or excessive gas:  rest,   eat lightly and use a heating pad.  - Sore Throat: treat with throat lozenges and/or gargle with warm salt   water.  - Because air was used during the procedure, expelling large amounts of air   from your rectum or belching is normal.  - If a bowel prep was taken, you may not have a bowel movement for 1-3 days.    This is normal.  SYMPTOMS TO WATCH FOR AND REPORT TO YOUR PHYSICIAN:  1. Abdominal pain or bloating, other than gas cramps.  2. Chest pain.  3. Back pain.  4. Signs of infection such as: chills or fever occurring within 24 hours   after the procedure.  5. Rectal bleeding, which would show as bright red, maroon, or black stools.   (A tablespoon of blood from the rectum is not serious, especially  if   hemorrhoids are present.)  6. Vomiting.  7. Weakness or dizziness.  GO DIRECTLY TO THE NEAREST EMERGENCY ROOM IF YOU HAVE ANY OF THE FOLLOWING:      Difficulty breathing              Chills and/or fever over 101 F   Persistent vomiting and/or vomiting blood   Severe abdominal pain   Severe chest pain   Black, tarry stools   Bleeding- more than one tablespoon   Any other symptom or condition that you feel may need urgent attention  Your doctor recommends these additional instructions:  If any biopsies were taken, your doctors clinic will contact you in 1 to 2   weeks with any results.  - Discharge patient to home (via wheelchair).   - Resume previous diet.   - Continue present medications.   - Await pathology results.   - Repeat colonoscopy in 7 years for surveillance.   - Telephone GI clinic for pathology results in 2 weeks.   - Patient has a contact number available for emergencies.  The signs and   symptoms of potential delayed complications were discussed with the   patient.  Return to normal activities tomorrow.  Written discharge   instructions were provided to the patient.  For questions, problems or results please call your physician Marcia Acevedo MD at Work:  [phone]  If you have any questions about the above instructions, call the GI   department at [phone] or call the endoscopy unit at [phone]   from 7am until 3 pm.  [facility] [place], EMERGENCY ROOM PHONE NUMBER:   [phone]  IF A COMPLICATION OR EMERGENCY SITUATION ARISES AND YOU ARE UNABLE TO REACH   YOUR PHYSICIAN - GO DIRECTLY TO THE EMERGENCY ROOM.  I have read or have had read to me these discharge instructions for my   procedure and have received a written copy.  I understand these   instructions and will follow-up with my physician if I have any questions.     __________________________________       _____________________________________  Nurse Signature                                           Patient/Designated   Responsible Party Signature  MD Marcia Parekh MD  9/16/2022 12:19:57 PM  [employer]

## 2022-09-16 NOTE — PLAN OF CARE
Discharge instructions reviewed with patient and visitor. Handouts given & verbalized understanding with no further questions at this time. Dr Pena spoke to pt at bedside, reviewed procedure and findings, answered questions. Made aware they are awaiting biopsy results with MD telephone number provided per AVS sheet. VSS on RA, no pain or nausea noted, tolerating po fluids, no complaints noted. Fall precautions reviewed, consents in chart, PIV removed at this time.

## 2022-09-16 NOTE — H&P
Short Stay Endoscopy History and Physical    PCP - Marlon Wilhelm MD    Procedure - Colonoscopy  ASA - 2  Mallampati - per anesthesia  History of Anesthesia problems - no  Family history Anesthesia problems -  no     HPI:  This is a 51 y.o. male here for evaluation of :   Active Hospital Problems    Diagnosis  POA    *Encounter for screening colonoscopy [Z12.11]  Not Applicable      Resolved Hospital Problems   No resolved problems to display.         Health Maintenance         Date Due Completion Date    Hepatitis C Screening Never done ---    COVID-19 Vaccine (1) Never done ---    HIV Screening Never done ---    TETANUS VACCINE Never done ---    Colorectal Cancer Screening Never done ---    Shingles Vaccine (1 of 2) Never done ---    Influenza Vaccine (1) Never done ---    Lipid Panel 2027            ROS:  CONSTITUTIONAL: Denies weight change,  fatigue, fevers, chills, night sweats.  CARDIOVASCULAR: Denies chest pain, shortness of breath, orthopnea and edema.  RESPIRATORY: Denies cough, hemoptysis, dyspnea, and wheezing.  GI: See HPI.    Medical History:   Past Medical History:   Diagnosis Date    Hypertension     Hypogonadism in male        Surgical History:   Past Surgical History:   Procedure Laterality Date    KNEE SURGERY         Family History:   Family History   Problem Relation Age of Onset    Arthritis Mother     Depression Mother     Hypertension Mother     Aneurysm Mother     Hypertension Father     Cancer Paternal Grandmother     Muscular dystrophy Paternal Grandfather        Social History:   Social History     Tobacco Use    Smoking status: Former     Packs/day: 0.50     Years: 5.00     Pack years: 2.50     Types: Cigarettes     Quit date:      Years since quittin.7    Smokeless tobacco: Never   Substance Use Topics    Alcohol use: Yes     Alcohol/week: 10.0 standard drinks     Types: 10 Glasses of wine per week    Drug use: Never       Allergies:   Review of patient's  allergies indicates:  No Known Allergies    Medications:   No current facility-administered medications on file prior to encounter.     Current Outpatient Medications on File Prior to Encounter   Medication Sig Dispense Refill    losartan-hydrochlorothiazide 100-25 mg (HYZAAR) 100-25 mg per tablet Take 1 tablet by mouth once daily. 90 tablet 3    baclofen (LIORESAL) 10 MG tablet Take 1 tablet (10 mg total) by mouth every evening. for 10 days 10 tablet 0    testosterone enanthate (DELATESTRYL) 200 mg/mL injection Inject into the muscle every 14 (fourteen) days.         Physical Exam:  Vital Signs:   Vitals:    09/16/22 1143   BP: (!) 161/105   Pulse:    Resp:    Temp:      General Appearance: Well appearing in no acute distress  ENT: OP clear  Chest: CTA B  CV: RRR, no m/r/g  Abd: s/nt/nd/nabs  Ext: no edema    Labs:Reviewed    IMP:  Active Hospital Problems    Diagnosis  POA    *Encounter for screening colonoscopy [Z12.11]  Not Applicable      Resolved Hospital Problems   No resolved problems to display.         Plan:   I have explained the risks and benefits of colonoscopy to the patient including but not limited to bleeding, perforation, infection, and death. The patient wishes to proceed.

## 2022-09-19 ENCOUNTER — LAB VISIT (OUTPATIENT)
Dept: LAB | Facility: HOSPITAL | Age: 52
End: 2022-09-19
Attending: NURSE PRACTITIONER
Payer: COMMERCIAL

## 2022-09-19 ENCOUNTER — OFFICE VISIT (OUTPATIENT)
Dept: INTERNAL MEDICINE | Facility: CLINIC | Age: 52
End: 2022-09-19
Payer: COMMERCIAL

## 2022-09-19 VITALS
OXYGEN SATURATION: 96 % | TEMPERATURE: 98 F | HEIGHT: 72 IN | WEIGHT: 263.25 LBS | BODY MASS INDEX: 35.66 KG/M2 | SYSTOLIC BLOOD PRESSURE: 138 MMHG | HEART RATE: 74 BPM | DIASTOLIC BLOOD PRESSURE: 88 MMHG

## 2022-09-19 DIAGNOSIS — R79.89 ELEVATED SERUM CREATININE: ICD-10-CM

## 2022-09-19 DIAGNOSIS — D75.1 POLYCYTHEMIA: ICD-10-CM

## 2022-09-19 DIAGNOSIS — I10 PRIMARY HYPERTENSION: Primary | ICD-10-CM

## 2022-09-19 DIAGNOSIS — E29.1 HYPOGONADISM IN MALE: ICD-10-CM

## 2022-09-19 LAB
ANION GAP SERPL CALC-SCNC: 12 MMOL/L (ref 8–16)
BASOPHILS # BLD AUTO: 0.08 K/UL (ref 0–0.2)
BASOPHILS NFR BLD: 1.2 % (ref 0–1.9)
BUN SERPL-MCNC: 24 MG/DL (ref 6–20)
CALCIUM SERPL-MCNC: 10.2 MG/DL (ref 8.7–10.5)
CHLORIDE SERPL-SCNC: 97 MMOL/L (ref 95–110)
CO2 SERPL-SCNC: 28 MMOL/L (ref 23–29)
CREAT SERPL-MCNC: 1.1 MG/DL (ref 0.5–1.4)
DIFFERENTIAL METHOD: ABNORMAL
EOSINOPHIL # BLD AUTO: 0.1 K/UL (ref 0–0.5)
EOSINOPHIL NFR BLD: 1.9 % (ref 0–8)
ERYTHROCYTE [DISTWIDTH] IN BLOOD BY AUTOMATED COUNT: 13.2 % (ref 11.5–14.5)
EST. GFR  (NO RACE VARIABLE): >60 ML/MIN/1.73 M^2
GLUCOSE SERPL-MCNC: 95 MG/DL (ref 70–110)
HCT VFR BLD AUTO: 46 % (ref 40–54)
HGB BLD-MCNC: 15.6 G/DL (ref 14–18)
IMM GRANULOCYTES # BLD AUTO: 0.12 K/UL (ref 0–0.04)
IMM GRANULOCYTES NFR BLD AUTO: 1.7 % (ref 0–0.5)
LYMPHOCYTES # BLD AUTO: 1.5 K/UL (ref 1–4.8)
LYMPHOCYTES NFR BLD: 22.3 % (ref 18–48)
MCH RBC QN AUTO: 30.5 PG (ref 27–31)
MCHC RBC AUTO-ENTMCNC: 33.9 G/DL (ref 32–36)
MCV RBC AUTO: 90 FL (ref 82–98)
MONOCYTES # BLD AUTO: 0.7 K/UL (ref 0.3–1)
MONOCYTES NFR BLD: 9.6 % (ref 4–15)
NEUTROPHILS # BLD AUTO: 4.4 K/UL (ref 1.8–7.7)
NEUTROPHILS NFR BLD: 63.3 % (ref 38–73)
NRBC BLD-RTO: 0 /100 WBC
PLATELET # BLD AUTO: 167 K/UL (ref 150–450)
PMV BLD AUTO: 12.1 FL (ref 9.2–12.9)
POTASSIUM SERPL-SCNC: 4.1 MMOL/L (ref 3.5–5.1)
RBC # BLD AUTO: 5.11 M/UL (ref 4.6–6.2)
SODIUM SERPL-SCNC: 137 MMOL/L (ref 136–145)
WBC # BLD AUTO: 6.9 K/UL (ref 3.9–12.7)

## 2022-09-19 PROCEDURE — 3008F BODY MASS INDEX DOCD: CPT | Mod: CPTII,S$GLB,, | Performed by: NURSE PRACTITIONER

## 2022-09-19 PROCEDURE — 85025 COMPLETE CBC W/AUTO DIFF WBC: CPT | Performed by: INTERNAL MEDICINE

## 2022-09-19 PROCEDURE — 99999 PR PBB SHADOW E&M-EST. PATIENT-LVL V: CPT | Mod: PBBFAC,,, | Performed by: NURSE PRACTITIONER

## 2022-09-19 PROCEDURE — 1159F PR MEDICATION LIST DOCUMENTED IN MEDICAL RECORD: ICD-10-PCS | Mod: CPTII,S$GLB,, | Performed by: NURSE PRACTITIONER

## 2022-09-19 PROCEDURE — 3075F PR MOST RECENT SYSTOLIC BLOOD PRESS GE 130-139MM HG: ICD-10-PCS | Mod: CPTII,S$GLB,, | Performed by: NURSE PRACTITIONER

## 2022-09-19 PROCEDURE — 3008F PR BODY MASS INDEX (BMI) DOCUMENTED: ICD-10-PCS | Mod: CPTII,S$GLB,, | Performed by: NURSE PRACTITIONER

## 2022-09-19 PROCEDURE — 36415 COLL VENOUS BLD VENIPUNCTURE: CPT | Mod: PO | Performed by: INTERNAL MEDICINE

## 2022-09-19 PROCEDURE — 3075F SYST BP GE 130 - 139MM HG: CPT | Mod: CPTII,S$GLB,, | Performed by: NURSE PRACTITIONER

## 2022-09-19 PROCEDURE — 99214 OFFICE O/P EST MOD 30 MIN: CPT | Mod: S$GLB,,, | Performed by: NURSE PRACTITIONER

## 2022-09-19 PROCEDURE — 3079F DIAST BP 80-89 MM HG: CPT | Mod: CPTII,S$GLB,, | Performed by: NURSE PRACTITIONER

## 2022-09-19 PROCEDURE — 1159F MED LIST DOCD IN RCRD: CPT | Mod: CPTII,S$GLB,, | Performed by: NURSE PRACTITIONER

## 2022-09-19 PROCEDURE — 80048 BASIC METABOLIC PNL TOTAL CA: CPT | Performed by: INTERNAL MEDICINE

## 2022-09-19 PROCEDURE — 1160F PR REVIEW ALL MEDS BY PRESCRIBER/CLIN PHARMACIST DOCUMENTED: ICD-10-PCS | Mod: CPTII,S$GLB,, | Performed by: NURSE PRACTITIONER

## 2022-09-19 PROCEDURE — 99999 PR PBB SHADOW E&M-EST. PATIENT-LVL V: ICD-10-PCS | Mod: PBBFAC,,, | Performed by: NURSE PRACTITIONER

## 2022-09-19 PROCEDURE — 3079F PR MOST RECENT DIASTOLIC BLOOD PRESSURE 80-89 MM HG: ICD-10-PCS | Mod: CPTII,S$GLB,, | Performed by: NURSE PRACTITIONER

## 2022-09-19 PROCEDURE — 99214 PR OFFICE/OUTPT VISIT, EST, LEVL IV, 30-39 MIN: ICD-10-PCS | Mod: S$GLB,,, | Performed by: NURSE PRACTITIONER

## 2022-09-19 PROCEDURE — 1160F RVW MEDS BY RX/DR IN RCRD: CPT | Mod: CPTII,S$GLB,, | Performed by: NURSE PRACTITIONER

## 2022-09-19 RX ORDER — AMLODIPINE BESYLATE 10 MG/1
10 TABLET ORAL DAILY
Qty: 90 TABLET | Refills: 1 | Status: SHIPPED | OUTPATIENT
Start: 2022-09-19 | End: 2023-04-10

## 2022-09-19 RX ORDER — PREDNISOLONE ACETATE 10 MG/ML
SUSPENSION/ DROPS OPHTHALMIC
COMMUNITY
Start: 2022-09-14

## 2022-09-19 RX ORDER — MOXIFLOXACIN 5 MG/ML
SOLUTION/ DROPS OPHTHALMIC
COMMUNITY
Start: 2022-09-14

## 2022-09-19 NOTE — PROGRESS NOTES
Subjective:       Patient ID: Marcus Peterson is a 51 y.o. male.    Chief Complaint: Hypertension    Pt presents to clinic today for bp follow up  Pt went for colonoscopy on Friday and his bp was running high  150s/100s. He sent a message via portal about his bp   I increased his amlodipine to 10 mg and had pt come in this afternoon for bp check  BP initially was still a little elevated but recheck showed it to be better controlled  He is able to check his bp at home and is seeing it trend down  He is encouraged to try to exercise and get some weight off of him  He has done it in the past and knows how he can do it    He would like to see about getting back on his testosterone  He was on it up until about 6 months ago when his script ran out        /88   Pulse 74   Temp 97.7 °F (36.5 °C) (Temporal)   Ht 6' (1.829 m)   Wt 119.4 kg (263 lb 3.7 oz)   SpO2 96%   BMI 35.70 kg/m²     Review of Systems   Constitutional:  Negative for activity change, appetite change, chills, diaphoresis, fatigue, fever and unexpected weight change.   HENT: Negative.     Eyes: Negative.    Respiratory:  Negative for apnea, chest tightness, shortness of breath and stridor.    Cardiovascular:  Negative for chest pain, palpitations and leg swelling.   Gastrointestinal: Negative.    Endocrine: Negative.    Genitourinary: Negative.    Musculoskeletal:  Negative for arthralgias and myalgias.   Skin:  Negative for color change, pallor, rash and wound.   Allergic/Immunologic: Negative.    Neurological:  Negative for dizziness, facial asymmetry, light-headedness and headaches.   Hematological:  Negative for adenopathy.   Psychiatric/Behavioral:  Negative for agitation and behavioral problems.      Objective:      Physical Exam  Vitals and nursing note reviewed.   Constitutional:       General: He is not in acute distress.     Appearance: He is well-developed. He is not diaphoretic.   HENT:      Head: Normocephalic and atraumatic.    Cardiovascular:      Rate and Rhythm: Normal rate and regular rhythm.      Heart sounds: Normal heart sounds.   Pulmonary:      Effort: Pulmonary effort is normal. No respiratory distress.      Breath sounds: Normal breath sounds.   Skin:     General: Skin is warm and dry.      Findings: No rash.   Neurological:      Mental Status: He is alert and oriented to person, place, and time.   Psychiatric:         Behavior: Behavior normal.         Thought Content: Thought content normal.         Judgment: Judgment normal.       Assessment:       1. Primary hypertension    2. Hypogonadism in male    3. BMI 35.0-35.9,adult        Plan:       Marcus was seen today for hypertension.    Diagnoses and all orders for this visit:    Primary hypertension  -     amLODIPine (NORVASC) 10 MG tablet; Take 1 tablet (10 mg total) by mouth once daily.  - Chronic, stable- continue norvasc at 10 mg and losartan/hctz 100-25  - Diet and exercise discussed. Continue to monitor bp at home, 3 month follow up    Hypogonadism in male  -     Ambulatory referral/consult to Urology; Future  - Will refer to urology for management    BMI 35.0-35.9,adult    Follow up for worsening or no improvement in symptoms and PRN.

## 2022-09-22 LAB
FINAL PATHOLOGIC DIAGNOSIS: NORMAL
GROSS: NORMAL
Lab: NORMAL

## 2022-10-14 ENCOUNTER — OFFICE VISIT (OUTPATIENT)
Dept: UROLOGY | Facility: CLINIC | Age: 52
End: 2022-10-14
Payer: COMMERCIAL

## 2022-10-14 ENCOUNTER — LAB VISIT (OUTPATIENT)
Dept: LAB | Facility: HOSPITAL | Age: 52
End: 2022-10-14
Attending: UROLOGY
Payer: COMMERCIAL

## 2022-10-14 VITALS
DIASTOLIC BLOOD PRESSURE: 80 MMHG | SYSTOLIC BLOOD PRESSURE: 130 MMHG | WEIGHT: 263.69 LBS | BODY MASS INDEX: 35.76 KG/M2

## 2022-10-14 DIAGNOSIS — E29.1 HYPOGONADISM IN MALE: ICD-10-CM

## 2022-10-14 DIAGNOSIS — N52.9 ERECTILE DYSFUNCTION, UNSPECIFIED ERECTILE DYSFUNCTION TYPE: Primary | ICD-10-CM

## 2022-10-14 DIAGNOSIS — N13.8 BPH WITH OBSTRUCTION/LOWER URINARY TRACT SYMPTOMS: ICD-10-CM

## 2022-10-14 DIAGNOSIS — N40.1 BPH WITH OBSTRUCTION/LOWER URINARY TRACT SYMPTOMS: ICD-10-CM

## 2022-10-14 LAB
LH SERPL-ACNC: 2.9 MIU/ML (ref 0.6–12.1)
PROLACTIN SERPL IA-MCNC: 9.3 NG/ML (ref 3.5–19.4)

## 2022-10-14 PROCEDURE — 83002 ASSAY OF GONADOTROPIN (LH): CPT | Performed by: UROLOGY

## 2022-10-14 PROCEDURE — 99999 PR PBB SHADOW E&M-EST. PATIENT-LVL III: ICD-10-PCS | Mod: PBBFAC,,, | Performed by: UROLOGY

## 2022-10-14 PROCEDURE — 99204 OFFICE O/P NEW MOD 45 MIN: CPT | Mod: S$GLB,,, | Performed by: UROLOGY

## 2022-10-14 PROCEDURE — 84146 ASSAY OF PROLACTIN: CPT | Performed by: UROLOGY

## 2022-10-14 PROCEDURE — 99999 PR PBB SHADOW E&M-EST. PATIENT-LVL III: CPT | Mod: PBBFAC,,, | Performed by: UROLOGY

## 2022-10-14 PROCEDURE — 1159F MED LIST DOCD IN RCRD: CPT | Mod: CPTII,S$GLB,, | Performed by: UROLOGY

## 2022-10-14 PROCEDURE — 3075F PR MOST RECENT SYSTOLIC BLOOD PRESS GE 130-139MM HG: ICD-10-PCS | Mod: CPTII,S$GLB,, | Performed by: UROLOGY

## 2022-10-14 PROCEDURE — 3075F SYST BP GE 130 - 139MM HG: CPT | Mod: CPTII,S$GLB,, | Performed by: UROLOGY

## 2022-10-14 PROCEDURE — 1159F PR MEDICATION LIST DOCUMENTED IN MEDICAL RECORD: ICD-10-PCS | Mod: CPTII,S$GLB,, | Performed by: UROLOGY

## 2022-10-14 PROCEDURE — 3079F DIAST BP 80-89 MM HG: CPT | Mod: CPTII,S$GLB,, | Performed by: UROLOGY

## 2022-10-14 PROCEDURE — 99204 PR OFFICE/OUTPT VISIT, NEW, LEVL IV, 45-59 MIN: ICD-10-PCS | Mod: S$GLB,,, | Performed by: UROLOGY

## 2022-10-14 PROCEDURE — 82040 ASSAY OF SERUM ALBUMIN: CPT | Performed by: UROLOGY

## 2022-10-14 PROCEDURE — 3079F PR MOST RECENT DIASTOLIC BLOOD PRESSURE 80-89 MM HG: ICD-10-PCS | Mod: CPTII,S$GLB,, | Performed by: UROLOGY

## 2022-10-14 PROCEDURE — 36415 COLL VENOUS BLD VENIPUNCTURE: CPT | Mod: PN | Performed by: UROLOGY

## 2022-10-14 RX ORDER — SYRINGE, DISPOSABLE, 3 ML
SYRINGE, EMPTY DISPOSABLE MISCELLANEOUS
Qty: 12 EACH | Refills: 4 | Status: SHIPPED | OUTPATIENT
Start: 2022-10-14 | End: 2022-12-19 | Stop reason: SDUPTHER

## 2022-10-14 RX ORDER — TESTOSTERONE CYPIONATE 200 MG/ML
120 INJECTION, SOLUTION INTRAMUSCULAR
Qty: 4 ML | Refills: 0 | Status: SHIPPED | OUTPATIENT
Start: 2022-10-14 | End: 2022-10-20 | Stop reason: SDUPTHER

## 2022-10-14 RX ORDER — NEEDLES, DISPOSABLE 25GX5/8"
NEEDLE, DISPOSABLE MISCELLANEOUS
Qty: 12 EACH | Refills: 4 | Status: SHIPPED | OUTPATIENT
Start: 2022-10-14 | End: 2022-12-19 | Stop reason: SDUPTHER

## 2022-10-14 RX ORDER — SCOLOPAMINE TRANSDERMAL SYSTEM 1 MG/1
1 PATCH, EXTENDED RELEASE TRANSDERMAL
Qty: 4 PATCH | Refills: 1 | Status: SHIPPED | OUTPATIENT
Start: 2022-10-14 | End: 2023-09-15 | Stop reason: SDUPTHER

## 2022-10-14 NOTE — PROGRESS NOTES
CC: hypogonadism    Referring Provider: Rush Santo     History of Present Illness:     10/14/22-50yo male here for evaluation of low T. Was previously on testosterone when he lived in [place]. Has been off for 8-9 months. Since that time, he reports that he has had a 30 pound fat gain. He has also started HTN meds. Now, he reports low libido. Has difficulty maintaining erections. Low energy. Stream is a little weaker and he has a little post-void dribble.   He was on T cypionate 200mg/mL 0.6mL Q7 days. Was on it for about 4 years.        Review of Systems   Constitutional:  Negative for chills and fever.   Respiratory:  Negative for shortness of breath.    Cardiovascular:  Negative for chest pain.   Gastrointestinal:  Negative for abdominal pain.   All other systems reviewed and are negative.      Past Medical History:   Diagnosis Date    Hypertension     Hypogonadism in male        Past Surgical History:   Procedure Laterality Date    COLONOSCOPY N/A 2022    Procedure: COLONOSCOPY;  Surgeon: Marcia Acevedo MD;  Location: [facility];  Service: Endoscopy;  Laterality: N/A;    EYE SURGERY      KNEE SURGERY         Family History   Problem Relation Age of Onset    Arthritis Mother     Depression Mother     Hypertension Mother     Aneurysm Mother     Hypertension Father     Cancer Paternal Grandmother     Muscular dystrophy Paternal Grandfather        Social History     Tobacco Use    Smoking status: Former     Packs/day: 0.50     Years: 5.00     Pack years: 2.50     Types: Cigarettes     Quit date:      Years since quittin.8    Smokeless tobacco: Never   Substance Use Topics    Alcohol use: Yes     Alcohol/week: 10.0 standard drinks     Types: 10 Glasses of wine per week    Drug use: Never       Current Outpatient Medications   Medication Sig Dispense Refill    amLODIPine (NORVASC) 10 MG tablet Take 1 tablet (10 mg total) by mouth once daily. 90 tablet 1    losartan-hydrochlorothiazide 100-25 mg  "(HYZAAR) 100-25 mg per tablet Take 1 tablet by mouth once daily. 90 tablet 3    moxifloxacin (VIGAMOX) 0.5 % ophthalmic solution       prednisoLONE acetate (PRED FORTE) 1 % DrpS       baclofen (LIORESAL) 10 MG tablet Take 1 tablet (10 mg total) by mouth every evening. for 10 days 10 tablet 0    needle, disp, 18 G (HYPODERMIC NEEDLES) 18 gauge x 1 1/2" Ndle Use as directed 12 each 4    needle, disp, 25 gauge 25 gauge x 1 1/2" Ndle Use as directed 12 each 4    scopolamine (TRANSDERM-SCOP) 1.3-1.5 mg (1 mg over 3 days) Place 1 patch onto the skin every 72 hours. 4 patch 1    syringe, disposable, 3 mL Syrg Use as directed 12 each 4    testosterone cypionate (DEPOTESTOTERONE CYPIONATE) 200 mg/mL injection Inject 0.6 mLs (120 mg total) into the muscle every 7 days. 4 mL 0     No current facility-administered medications for this visit.     Facility-Administered Medications Ordered in Other Visits   Medication Dose Route Frequency Provider Last Rate Last Admin    lactated ringers infusion   Intravenous Continuous Lamingodwin Mckeon MD 10 mL/hr at 09/16/22 1140 Restarted at 09/16/22 1202       Review of patient's allergies indicates:  No Known Allergies    Physical Exam  Vitals:    10/14/22 0811   BP: 130/80       General: Well-developed, well-nourished in no acute distress  HEENT: Normocephalic, atraumatic, Extraocular movements intact  Neck: supple, trachea midline, no cervical or supraclavicular lymphadenopathy  Respirations: even and unlabored  Back: midline spine, no CVA tenderness  : 10/14/22-circumcised male phallus without lesions, orthotopic urethral meatus, no inguinal hernia, no inguinal lymphadenopathy, no scrotal lesions, testicles descended bilaterally without mass or tenderness  Rectal: 10/14/22-30g prostate, no nodules or tenderness. No gross blood  Extremities: atraumatic, moves all equally, no clubbing, cyanosis or edema  Psych: normal affect  Skin: warm and dry, no lesions  Neuro: Alert and oriented, " "Cranial nerves II-XII grossly intact        Lab Results   Component Value Date    PSA 2.4 06/13/2022       Assessment:   1. Erectile dysfunction, unspecified erectile dysfunction type        2. Hypogonadism in male  Ambulatory referral/consult to Urology    TESTOSTERONE PANEL    Prolactin    Luteinizing Hormone    CBC Without Differential    Comprehensive Metabolic Panel    Prostate Specific Antigen, Diagnostic    Testosterone      3. BPH with obstruction/lower urinary tract symptoms            Plan:  Erectile dysfunction, unspecified erectile dysfunction type    Hypogonadism in male  -     Ambulatory referral/consult to Urology  -     TESTOSTERONE PANEL; Future; Expected date: 10/14/2022  -     Prolactin; Future; Expected date: 10/14/2022  -     Luteinizing Hormone; Future; Expected date: 10/14/2022  -     CBC Without Differential; Future; Expected date: 04/14/2023  -     Comprehensive Metabolic Panel; Future; Expected date: 04/14/2023  -     Prostate Specific Antigen, Diagnostic; Future; Expected date: 04/14/2023  -     Testosterone; Future; Expected date: 04/14/2023    BPH with obstruction/lower urinary tract symptoms    Other orders  -     testosterone cypionate (DEPOTESTOTERONE CYPIONATE) 200 mg/mL injection; Inject 0.6 mLs (120 mg total) into the muscle every 7 days.  Dispense: 4 mL; Refill: 0  -     syringe, disposable, 3 mL Syrg; Use as directed  Dispense: 12 each; Refill: 4  -     needle, disp, 18 G (HYPODERMIC NEEDLES) 18 gauge x 1 1/2" Ndle; Use as directed  Dispense: 12 each; Refill: 4  -     needle, disp, 25 gauge 25 gauge x 1 1/2" Ndle; Use as directed  Dispense: 12 each; Refill: 4  -     scopolamine (TRANSDERM-SCOP) 1.3-1.5 mg (1 mg over 3 days); Place 1 patch onto the skin every 72 hours.  Dispense: 4 patch; Refill: 1  Will send 1 month supply testosterone today. When labs return, will send 6 month supply.     Follow up in about 6 months (around 4/14/2023) for labs before visit.                    "

## 2022-10-20 LAB
ALBUMIN SERPL-MCNC: 4.6 G/DL (ref 3.6–5.1)
SHBG SERPL-SCNC: 29 NMOL/L (ref 10–50)
TESTOST FREE SERPL-MCNC: 44.3 PG/ML (ref 46–224)
TESTOST SERPL-MCNC: 316 NG/DL (ref 250–1100)
TESTOSTERONE.FREE+WB SERPL-MCNC: 93.1 NG/DL (ref 110–575)

## 2022-10-20 RX ORDER — TESTOSTERONE CYPIONATE 200 MG/ML
120 INJECTION, SOLUTION INTRAMUSCULAR
Qty: 4 ML | Refills: 5 | Status: SHIPPED | OUTPATIENT
Start: 2022-10-20 | End: 2022-12-19 | Stop reason: SDUPTHER

## 2023-02-08 ENCOUNTER — PATIENT MESSAGE (OUTPATIENT)
Dept: INTERNAL MEDICINE | Facility: CLINIC | Age: 53
End: 2023-02-08
Payer: COMMERCIAL

## 2023-02-08 RX ORDER — METOPROLOL SUCCINATE 50 MG/1
50 TABLET, EXTENDED RELEASE ORAL DAILY
Qty: 30 TABLET | Refills: 11 | Status: SHIPPED | OUTPATIENT
Start: 2023-02-08 | End: 2023-04-11 | Stop reason: SDUPTHER

## 2023-02-28 ENCOUNTER — PATIENT MESSAGE (OUTPATIENT)
Dept: INTERNAL MEDICINE | Facility: CLINIC | Age: 53
End: 2023-02-28
Payer: COMMERCIAL

## 2023-04-06 DIAGNOSIS — I10 PRIMARY HYPERTENSION: ICD-10-CM

## 2023-04-06 NOTE — TELEPHONE ENCOUNTER
Care Due:                  Date            Visit Type   Department     Provider  --------------------------------------------------------------------------------                                EP -                              PRIMARY      [facility] INTERNAL  Last Visit: 06-      CARE ([facility])   MEDICINE       Marlon Wilhelm  Next Visit: None Scheduled  None         None Found                                                            Last  Test          Frequency    Reason                     Performed    Due Date  --------------------------------------------------------------------------------    Office Visit  12 months..  losartan-hydrochlorothiaz  06- 06-                             natalia, metoprolol..........    [employer] Embedded Care Gaps. Reference number: [id number]. 4/06/2023   5:47:51 PM CDT

## 2023-04-07 RX ORDER — METOPROLOL SUCCINATE 50 MG/1
TABLET, EXTENDED RELEASE ORAL
Refills: 0 | OUTPATIENT
Start: 2023-04-07

## 2023-04-07 RX ORDER — LOSARTAN POTASSIUM AND HYDROCHLOROTHIAZIDE 25; 100 MG/1; MG/1
TABLET ORAL
Refills: 0 | OUTPATIENT
Start: 2023-04-07

## 2023-04-07 RX ORDER — AMLODIPINE BESYLATE 10 MG/1
TABLET ORAL
Refills: 0 | OUTPATIENT
Start: 2023-04-07

## 2023-04-07 NOTE — TELEPHONE ENCOUNTER
Refill Decision Note   Marcus Peterson  is requesting a refill authorization.  Brief Assessment and Rationale for Refill:  Quick Discontinue     Medication Therapy Plan: Pharmacy is requesting new scripts for the following medications without required information, (sig/ frequency/qty/etc)     Medication Reconciliation Completed: No   Comments:     No Care Gaps recommended.     Note composed:6:23 AM 04/07/2023

## 2023-04-10 ENCOUNTER — LAB VISIT (OUTPATIENT)
Dept: LAB | Facility: HOSPITAL | Age: 53
End: 2023-04-10
Attending: UROLOGY
Payer: COMMERCIAL

## 2023-04-10 DIAGNOSIS — E29.1 HYPOGONADISM IN MALE: ICD-10-CM

## 2023-04-10 LAB
ERYTHROCYTE [DISTWIDTH] IN BLOOD BY AUTOMATED COUNT: 13.6 % (ref 11.5–14.5)
HCT VFR BLD AUTO: 48.3 % (ref 40–54)
HGB BLD-MCNC: 15.6 G/DL (ref 14–18)
MCH RBC QN AUTO: 30.5 PG (ref 27–31)
MCHC RBC AUTO-ENTMCNC: 32.3 G/DL (ref 32–36)
MCV RBC AUTO: 95 FL (ref 82–98)
PLATELET # BLD AUTO: 155 K/UL (ref 150–450)
PMV BLD AUTO: 12.6 FL (ref 9.2–12.9)
RBC # BLD AUTO: 5.11 M/UL (ref 4.6–6.2)
WBC # BLD AUTO: 5.76 K/UL (ref 3.9–12.7)

## 2023-04-10 PROCEDURE — 80053 COMPREHEN METABOLIC PANEL: CPT | Performed by: UROLOGY

## 2023-04-10 PROCEDURE — 84153 ASSAY OF PSA TOTAL: CPT | Performed by: UROLOGY

## 2023-04-10 PROCEDURE — 36415 COLL VENOUS BLD VENIPUNCTURE: CPT | Mod: PN | Performed by: UROLOGY

## 2023-04-10 PROCEDURE — 84403 ASSAY OF TOTAL TESTOSTERONE: CPT | Performed by: UROLOGY

## 2023-04-10 PROCEDURE — 85027 COMPLETE CBC AUTOMATED: CPT | Performed by: UROLOGY

## 2023-04-11 DIAGNOSIS — I10 PRIMARY HYPERTENSION: ICD-10-CM

## 2023-04-11 LAB
ALBUMIN SERPL BCP-MCNC: 4.2 G/DL (ref 3.5–5.2)
ALP SERPL-CCNC: 94 U/L (ref 55–135)
ALT SERPL W/O P-5'-P-CCNC: 52 U/L (ref 10–44)
ANION GAP SERPL CALC-SCNC: 14 MMOL/L (ref 8–16)
AST SERPL-CCNC: 34 U/L (ref 10–40)
BILIRUB SERPL-MCNC: 0.5 MG/DL (ref 0.1–1)
BUN SERPL-MCNC: 25 MG/DL (ref 6–20)
CALCIUM SERPL-MCNC: 9.3 MG/DL (ref 8.7–10.5)
CHLORIDE SERPL-SCNC: 103 MMOL/L (ref 95–110)
CO2 SERPL-SCNC: 24 MMOL/L (ref 23–29)
COMPLEXED PSA SERPL-MCNC: 2.3 NG/ML (ref 0–4)
CREAT SERPL-MCNC: 1 MG/DL (ref 0.5–1.4)
EST. GFR  (NO RACE VARIABLE): >60 ML/MIN/1.73 M^2
GLUCOSE SERPL-MCNC: 92 MG/DL (ref 70–110)
POTASSIUM SERPL-SCNC: 4.1 MMOL/L (ref 3.5–5.1)
PROT SERPL-MCNC: 7.4 G/DL (ref 6–8.4)
SODIUM SERPL-SCNC: 141 MMOL/L (ref 136–145)
TESTOST SERPL-MCNC: 397 NG/DL (ref 304–1227)

## 2023-04-11 RX ORDER — AMLODIPINE BESYLATE 10 MG/1
10 TABLET ORAL DAILY
Qty: 90 TABLET | Refills: 0 | Status: SHIPPED | OUTPATIENT
Start: 2023-04-11 | End: 2023-04-12 | Stop reason: SDUPTHER

## 2023-04-11 RX ORDER — METOPROLOL SUCCINATE 50 MG/1
50 TABLET, EXTENDED RELEASE ORAL DAILY
Qty: 30 TABLET | Refills: 0 | Status: SHIPPED | OUTPATIENT
Start: 2023-04-11 | End: 2023-05-12

## 2023-04-11 RX ORDER — LOSARTAN POTASSIUM AND HYDROCHLOROTHIAZIDE 25; 100 MG/1; MG/1
1 TABLET ORAL DAILY
Qty: 90 TABLET | Refills: 0
Start: 2023-04-11 | End: 2023-04-18 | Stop reason: SDUPTHER

## 2023-04-11 NOTE — TELEPHONE ENCOUNTER
----- Message from Abigail Bautista sent at 4/11/2023 10:01 AM CDT -----  Contact: [facility]  .Type:  RX Refill Request    Who Called: [facility]   Refill or New Rx:refill   RX Name and Strength:losartan-hydrochlorothiazide 100-25 mg (HYZAAR) 100-25 mg per tablet    metoprolol succinate (TOPROL-XL) 50 MG 24 hr tablet    amLODIPine (NORVASC) 10 MG tablet  How is the patient currently taking it? (ex. 1XDay):as prescribed   Is this a 30 day or 90 day RX:90 days   Preferred Pharmacy with phone number:.  [facility] - [address] [address]  Phone: [phone] Fax: [phone]  Local or Mail Order:mail   Ordering Provider:Dr. Wilhelm   Would the patient rather a call back or a response via [facility]? Call  Best Call Back Number:[phone]  Additional Information: patient needing refills

## 2023-04-11 NOTE — TELEPHONE ENCOUNTER
Patient overdue for follow up.  Refill is given but the patient needs an appointment with Dr. Wilhelm or Rush Santo NP, for follow up.

## 2023-04-11 NOTE — TELEPHONE ENCOUNTER
No new care gaps identified.  [facility] Embedded Care Gaps. Reference number: [id number]. 4/11/2023   10:20:24 AM CDT

## 2023-04-12 DIAGNOSIS — I10 PRIMARY HYPERTENSION: ICD-10-CM

## 2023-04-12 RX ORDER — AMLODIPINE BESYLATE 10 MG/1
10 TABLET ORAL DAILY
Qty: 90 TABLET | Refills: 0 | Status: SHIPPED | OUTPATIENT
Start: 2023-04-12 | End: 2023-07-24 | Stop reason: SDUPTHER

## 2023-04-12 NOTE — TELEPHONE ENCOUNTER
----- Message from Angela Dejesus sent at 4/12/2023 10:05 AM CDT -----  Regarding: MEDICATION REQUEST  Please refill the medication(s) listed below. Please call the patient when the prescription(s) is ready for  at this phone number     MARICHUY OLSON [[id number]]  [phone]      [facility] CALLED REQUESTING A RENEWAL ON PRESCRIPTION. PLEASE ADVISE!      Medication #1 losartan-hydrochlorothiazide 100-25 mg (HYZAAR) 100-25 mg per tablet        Preferred Pharmacy:    [facility] - [address] [address]  Phone: [phone] Fax: [phone]

## 2023-04-12 NOTE — TELEPHONE ENCOUNTER
Spoke w/ pt, he was informed that his medication was refilled on yesterday.     Pt verbalized understanding and stated that he is completely out of his Amlodipine and would like a few sent to the local pharmacy.     Medication pended.

## 2023-04-12 NOTE — TELEPHONE ENCOUNTER
No new care gaps identified.  [facility] Embedded Care Gaps. Reference number: [id number]. 4/12/2023   11:10:49 AM PHANIT

## 2023-04-14 ENCOUNTER — OFFICE VISIT (OUTPATIENT)
Dept: UROLOGY | Facility: CLINIC | Age: 53
End: 2023-04-14
Payer: COMMERCIAL

## 2023-04-14 VITALS
SYSTOLIC BLOOD PRESSURE: 131 MMHG | WEIGHT: 239 LBS | RESPIRATION RATE: 18 BRPM | DIASTOLIC BLOOD PRESSURE: 92 MMHG | HEART RATE: 75 BPM | BODY MASS INDEX: 32.41 KG/M2

## 2023-04-14 DIAGNOSIS — E29.1 HYPOGONADISM IN MALE: Primary | ICD-10-CM

## 2023-04-14 DIAGNOSIS — E66.09 CLASS 1 OBESITY DUE TO EXCESS CALORIES WITHOUT SERIOUS COMORBIDITY WITH BODY MASS INDEX (BMI) OF 32.0 TO 32.9 IN ADULT: ICD-10-CM

## 2023-04-14 PROCEDURE — 99999 PR PBB SHADOW E&M-EST. PATIENT-LVL IV: CPT | Mod: PBBFAC,,, | Performed by: UROLOGY

## 2023-04-14 PROCEDURE — 3075F SYST BP GE 130 - 139MM HG: CPT | Mod: CPTII,S$GLB,, | Performed by: UROLOGY

## 2023-04-14 PROCEDURE — 3008F PR BODY MASS INDEX (BMI) DOCUMENTED: ICD-10-PCS | Mod: CPTII,S$GLB,, | Performed by: UROLOGY

## 2023-04-14 PROCEDURE — 1159F PR MEDICATION LIST DOCUMENTED IN MEDICAL RECORD: ICD-10-PCS | Mod: CPTII,S$GLB,, | Performed by: UROLOGY

## 2023-04-14 PROCEDURE — 3080F PR MOST RECENT DIASTOLIC BLOOD PRESSURE >= 90 MM HG: ICD-10-PCS | Mod: CPTII,S$GLB,, | Performed by: UROLOGY

## 2023-04-14 PROCEDURE — 1159F MED LIST DOCD IN RCRD: CPT | Mod: CPTII,S$GLB,, | Performed by: UROLOGY

## 2023-04-14 PROCEDURE — 3075F PR MOST RECENT SYSTOLIC BLOOD PRESS GE 130-139MM HG: ICD-10-PCS | Mod: CPTII,S$GLB,, | Performed by: UROLOGY

## 2023-04-14 PROCEDURE — 3080F DIAST BP >= 90 MM HG: CPT | Mod: CPTII,S$GLB,, | Performed by: UROLOGY

## 2023-04-14 PROCEDURE — 99999 PR PBB SHADOW E&M-EST. PATIENT-LVL IV: ICD-10-PCS | Mod: PBBFAC,,, | Performed by: UROLOGY

## 2023-04-14 PROCEDURE — 99214 PR OFFICE/OUTPT VISIT, EST, LEVL IV, 30-39 MIN: ICD-10-PCS | Mod: S$GLB,,, | Performed by: UROLOGY

## 2023-04-14 PROCEDURE — 99214 OFFICE O/P EST MOD 30 MIN: CPT | Mod: S$GLB,,, | Performed by: UROLOGY

## 2023-04-14 PROCEDURE — 3008F BODY MASS INDEX DOCD: CPT | Mod: CPTII,S$GLB,, | Performed by: UROLOGY

## 2023-04-14 RX ORDER — NEEDLES, DISPOSABLE 27GX1/2"
NEEDLE, DISPOSABLE MISCELLANEOUS
Qty: 4 EACH | Refills: 12 | Status: SHIPPED | OUTPATIENT
Start: 2023-04-14

## 2023-04-14 RX ORDER — TESTOSTERONE CYPIONATE 200 MG/ML
120 INJECTION, SOLUTION INTRAMUSCULAR
Qty: 4 ML | Refills: 5 | Status: SHIPPED | OUTPATIENT
Start: 2023-04-14 | End: 2024-01-09 | Stop reason: SDUPTHER

## 2023-04-14 RX ORDER — SYRINGE, DISPOSABLE, 3 ML
SYRINGE, EMPTY DISPOSABLE MISCELLANEOUS
Qty: 12 EACH | Refills: 4 | Status: SHIPPED | OUTPATIENT
Start: 2023-04-14

## 2023-04-14 RX ORDER — NEEDLES, SAFETY 18GX1 1/2"
NEEDLE, DISPOSABLE MISCELLANEOUS
Qty: 4 EACH | Refills: 12 | Status: SHIPPED | OUTPATIENT
Start: 2023-04-14

## 2023-04-14 NOTE — PROGRESS NOTES
CC: hypogonadism    History of Present Illness:   23-patient has run out of T and energy and T are low. Would like to restart. Has lost 40 pounds from Intermittent Fasting over the past several months. Libido is low. No bothersome LUTS.   10/14/22-50yo male here for evaluation of low T. Was previously on testosterone when he lived in [place]. Has been off for 8-9 months. Since that time, he reports that he has had a 30 pound fat gain. He has also started HTN meds. Now, he reports low libido. Has difficulty maintaining erections. Low energy. Stream is a little weaker and he has a little post-void dribble.   He was on T cypionate 200mg/mL 0.6mL Q7 days. Was on it for about 4 years.        Review of Systems   Constitutional:  Negative for chills and fever.   Respiratory:  Negative for shortness of breath.    Cardiovascular:  Negative for chest pain.   Gastrointestinal:  Negative for abdominal pain.   All other systems reviewed and are negative.      Past Medical History:   Diagnosis Date    Hypertension     Hypogonadism in male        Past Surgical History:   Procedure Laterality Date    COLONOSCOPY N/A 2022    Procedure: COLONOSCOPY;  Surgeon: Marcia Acevedo MD;  Location: [facility];  Service: Endoscopy;  Laterality: N/A;    EYE SURGERY      KNEE SURGERY         Family History   Problem Relation Age of Onset    Arthritis Mother     Depression Mother     Hypertension Mother     Aneurysm Mother     Hypertension Father     Cancer Paternal Grandmother     Muscular dystrophy Paternal Grandfather        Social History     Tobacco Use    Smoking status: Former     Packs/day: 0.50     Years: 5.00     Pack years: 2.50     Types: Cigarettes     Quit date:      Years since quittin.3    Smokeless tobacco: Never   Substance Use Topics    Alcohol use: Yes     Alcohol/week: 10.0 standard drinks     Types: 10 Glasses of wine per week    Drug use: Never       Current Outpatient Medications   Medication Sig  "Dispense Refill    amLODIPine (NORVASC) 10 MG tablet Take 1 tablet (10 mg total) by mouth once daily. 90 tablet 0    metoprolol succinate (TOPROL-XL) 50 MG 24 hr tablet Take 1 tablet (50 mg total) by mouth once daily. 30 tablet 0    moxifloxacin (VIGAMOX) 0.5 % ophthalmic solution       needle, disp, 18 G (HYPODERMIC NEEDLES) 18 gauge x 1 1/2" Ndle Use as directed 12 each 4    needle, disp, 25 gauge 25 gauge x 1 1/2" Ndle Use as directed 12 each 4    prednisoLONE acetate (PRED FORTE) 1 % DrpS       scopolamine (TRANSDERM-SCOP) 1.3-1.5 mg (1 mg over 3 days) Place 1 patch onto the skin every 72 hours. 4 patch 1    syringe, disposable, 3 mL Syrg Use as directed 12 each 4    baclofen (LIORESAL) 10 MG tablet Take 1 tablet (10 mg total) by mouth every evening. for 10 days 10 tablet 0    losartan-hydrochlorothiazide 100-25 mg (HYZAAR) 100-25 mg per tablet Take 1 tablet by mouth once daily. 90 tablet 0    needle, disp, 18 G 18 gauge x 1" Ndle Use as directed 4 each 12    safety needles (ECLIPSE NEEDLE) 23 gauge x 1" Ndle Use as directed 4 each 12    syringe, disposable, 3 mL Syrg Use as directed 12 each 4    testosterone cypionate (DEPOTESTOTERONE CYPIONATE) 200 mg/mL injection Inject 0.6 mLs (120 mg total) into the muscle every 7 days. 4 mL 5     No current facility-administered medications for this visit.     Facility-Administered Medications Ordered in Other Visits   Medication Dose Route Frequency Provider Last Rate Last Admin    lactated ringers infusion   Intravenous Continuous Lamin Mckeon MD 10 mL/hr at 09/16/22 1140 Restarted at 09/16/22 1202       Review of patient's allergies indicates:  No Known Allergies    Physical Exam  Vitals:    04/14/23 1027   BP: (!) 131/92   Pulse: 75   Resp: 18       General: Well-developed, well-nourished in no acute distress  HEENT: Normocephalic, atraumatic, Extraocular movements intact  Neck: supple, trachea midline, no cervical or supraclavicular " "lymphadenopathy  Respirations: even and unlabored  Back: midline spine, no CVA tenderness  : 10/14/22-circumcised male phallus without lesions, orthotopic urethral meatus, no inguinal hernia, no inguinal lymphadenopathy, no scrotal lesions, testicles descended bilaterally without mass or tenderness  Rectal: 10/14/22-30g prostate, no nodules or tenderness. No gross blood  Extremities: atraumatic, moves all equally, no clubbing, cyanosis or edema  Psych: normal affect  Skin: warm and dry, no lesions  Neuro: Alert and oriented, Cranial nerves II-XII grossly intact    Testosterone:   4/10/23: 397     Lab Results   Component Value Date    PSA 2.4 06/13/2022       Assessment:   1. Hypogonadism in male  CBC Without Differential    Comprehensive Metabolic Panel    Prostate Specific Antigen, Diagnostic    Testosterone      2. Class 1 obesity due to excess calories without serious comorbidity with body mass index (BMI) of 32.0 to 32.9 in adult              Plan:  Hypogonadism in male  -     CBC Without Differential; Future; Expected date: 10/12/2023  -     Comprehensive Metabolic Panel; Future; Expected date: 10/12/2023  -     Prostate Specific Antigen, Diagnostic; Future; Expected date: 10/12/2023  -     Testosterone; Future; Expected date: 10/12/2023    Class 1 obesity due to excess calories without serious comorbidity with body mass index (BMI) of 32.0 to 32.9 in adult    Other orders  -     testosterone cypionate (DEPOTESTOTERONE CYPIONATE) 200 mg/mL injection; Inject 0.6 mLs (120 mg total) into the muscle every 7 days.  Dispense: 4 mL; Refill: 5  -     syringe, disposable, 3 mL Syrg; Use as directed  Dispense: 12 each; Refill: 4  -     needle, disp, 18 G 18 gauge x 1" Ndle; Use as directed  Dispense: 4 each; Refill: 12  -     safety needles (ECLIPSE NEEDLE) 23 gauge x 1" Ndle; Use as directed  Dispense: 4 each; Refill: 12    Continue dietary modifications for weight loss    Follow up in about 6 months (around " 10/14/2023) for labs before visit.

## 2023-04-18 ENCOUNTER — PATIENT MESSAGE (OUTPATIENT)
Dept: INTERNAL MEDICINE | Facility: CLINIC | Age: 53
End: 2023-04-18
Payer: COMMERCIAL

## 2023-04-18 RX ORDER — LOSARTAN POTASSIUM AND HYDROCHLOROTHIAZIDE 25; 100 MG/1; MG/1
1 TABLET ORAL DAILY
Qty: 90 TABLET | Refills: 0 | Status: SHIPPED | OUTPATIENT
Start: 2023-04-18 | End: 2023-07-24 | Stop reason: SDUPTHER

## 2023-04-18 NOTE — TELEPHONE ENCOUNTER
Pt's refill was done on 4/11/23, but no pharmacy was selected and it didn't print. LV 9/19/22, no f/u

## 2023-04-18 NOTE — TELEPHONE ENCOUNTER
No new care gaps identified.  [facility] Embedded Care Gaps. Reference number: [id number]. 4/18/2023   4:56:39 PM CDT

## 2023-04-26 ENCOUNTER — PATIENT MESSAGE (OUTPATIENT)
Dept: INTERNAL MEDICINE | Facility: CLINIC | Age: 53
End: 2023-04-26
Payer: COMMERCIAL

## 2023-07-24 ENCOUNTER — PATIENT MESSAGE (OUTPATIENT)
Dept: UROLOGY | Facility: CLINIC | Age: 53
End: 2023-07-24
Payer: COMMERCIAL

## 2023-07-24 ENCOUNTER — PATIENT MESSAGE (OUTPATIENT)
Dept: INTERNAL MEDICINE | Facility: CLINIC | Age: 53
End: 2023-07-24
Payer: COMMERCIAL

## 2023-07-24 DIAGNOSIS — I10 PRIMARY HYPERTENSION: ICD-10-CM

## 2023-07-24 RX ORDER — AMLODIPINE BESYLATE 10 MG/1
10 TABLET ORAL DAILY
Qty: 90 TABLET | Refills: 0 | Status: SHIPPED | OUTPATIENT
Start: 2023-07-24 | End: 2023-07-24 | Stop reason: SDUPTHER

## 2023-07-24 RX ORDER — LOSARTAN POTASSIUM AND HYDROCHLOROTHIAZIDE 25; 100 MG/1; MG/1
1 TABLET ORAL DAILY
Qty: 90 TABLET | Refills: 0 | Status: SHIPPED | OUTPATIENT
Start: 2023-07-24 | End: 2023-07-24 | Stop reason: SDUPTHER

## 2023-07-24 RX ORDER — METOPROLOL SUCCINATE 50 MG/1
50 TABLET, EXTENDED RELEASE ORAL DAILY
Qty: 90 TABLET | Refills: 0 | Status: SHIPPED | OUTPATIENT
Start: 2023-07-24 | End: 2023-07-24 | Stop reason: SDUPTHER

## 2023-07-24 NOTE — TELEPHONE ENCOUNTER
No care due was identified.  [facility] Embedded Care Due Messages. Reference number: [id number].   7/24/2023 5:11:00 PM CDT

## 2023-07-24 NOTE — TELEPHONE ENCOUNTER
Care Due:                  Date            Visit Type   Department     Provider  --------------------------------------------------------------------------------                                EP -                              PRIMARY      [facility] INTERNAL  Last Visit: 06-      CARE ([facility])   MEDICINE       Marlon Wilhelm  Next Visit: None Scheduled  None         None Found                                                            Last  Test          Frequency    Reason                     Performed    Due Date  --------------------------------------------------------------------------------    Office Visit  12 months..  amLODIPine,                06- 06-                             losartan-hydrochlorothiaz                             natalia, metoprolol..........    [employer] Embedded Care Due Messages. Reference number: [id number].   7/24/2023 4:14:54 PM CDT

## 2023-07-25 RX ORDER — AMLODIPINE BESYLATE 10 MG/1
10 TABLET ORAL DAILY
Qty: 90 TABLET | Refills: 0 | Status: SHIPPED | OUTPATIENT
Start: 2023-07-25 | End: 2023-09-15 | Stop reason: SDUPTHER

## 2023-07-25 RX ORDER — LOSARTAN POTASSIUM AND HYDROCHLOROTHIAZIDE 25; 100 MG/1; MG/1
1 TABLET ORAL DAILY
Qty: 90 TABLET | Refills: 0 | Status: SHIPPED | OUTPATIENT
Start: 2023-07-25 | End: 2023-09-15 | Stop reason: SDUPTHER

## 2023-07-25 RX ORDER — METOPROLOL SUCCINATE 50 MG/1
50 TABLET, EXTENDED RELEASE ORAL DAILY
Qty: 90 TABLET | Refills: 0 | Status: SHIPPED | OUTPATIENT
Start: 2023-07-25 | End: 2023-09-15 | Stop reason: SDUPTHER

## 2023-08-10 ENCOUNTER — TELEPHONE (OUTPATIENT)
Dept: UROLOGY | Facility: CLINIC | Age: 53
End: 2023-08-10
Payer: COMMERCIAL

## 2023-09-07 ENCOUNTER — PATIENT MESSAGE (OUTPATIENT)
Dept: ADMINISTRATIVE | Facility: HOSPITAL | Age: 53
End: 2023-09-07
Payer: COMMERCIAL

## 2023-09-15 ENCOUNTER — OFFICE VISIT (OUTPATIENT)
Dept: INTERNAL MEDICINE | Facility: CLINIC | Age: 53
End: 2023-09-15
Payer: COMMERCIAL

## 2023-09-15 VITALS
HEIGHT: 73 IN | OXYGEN SATURATION: 95 % | BODY MASS INDEX: 32.84 KG/M2 | HEART RATE: 71 BPM | DIASTOLIC BLOOD PRESSURE: 88 MMHG | TEMPERATURE: 98 F | WEIGHT: 247.81 LBS | SYSTOLIC BLOOD PRESSURE: 138 MMHG

## 2023-09-15 DIAGNOSIS — Z00.00 ROUTINE GENERAL MEDICAL EXAMINATION AT HEALTH CARE FACILITY: Primary | ICD-10-CM

## 2023-09-15 DIAGNOSIS — E29.1 HYPOGONADISM IN MALE: ICD-10-CM

## 2023-09-15 DIAGNOSIS — Z87.898 H/O MOTION SICKNESS: ICD-10-CM

## 2023-09-15 DIAGNOSIS — I10 PRIMARY HYPERTENSION: ICD-10-CM

## 2023-09-15 PROCEDURE — 99396 PR PREVENTIVE VISIT,EST,40-64: ICD-10-PCS | Mod: S$GLB,,, | Performed by: NURSE PRACTITIONER

## 2023-09-15 PROCEDURE — 3075F SYST BP GE 130 - 139MM HG: CPT | Mod: CPTII,S$GLB,, | Performed by: NURSE PRACTITIONER

## 2023-09-15 PROCEDURE — 1160F PR REVIEW ALL MEDS BY PRESCRIBER/CLIN PHARMACIST DOCUMENTED: ICD-10-PCS | Mod: CPTII,S$GLB,, | Performed by: NURSE PRACTITIONER

## 2023-09-15 PROCEDURE — 99396 PREV VISIT EST AGE 40-64: CPT | Mod: S$GLB,,, | Performed by: NURSE PRACTITIONER

## 2023-09-15 PROCEDURE — 3079F PR MOST RECENT DIASTOLIC BLOOD PRESSURE 80-89 MM HG: ICD-10-PCS | Mod: CPTII,S$GLB,, | Performed by: NURSE PRACTITIONER

## 2023-09-15 PROCEDURE — 3079F DIAST BP 80-89 MM HG: CPT | Mod: CPTII,S$GLB,, | Performed by: NURSE PRACTITIONER

## 2023-09-15 PROCEDURE — 99999 PR PBB SHADOW E&M-EST. PATIENT-LVL V: CPT | Mod: PBBFAC,,, | Performed by: NURSE PRACTITIONER

## 2023-09-15 PROCEDURE — 99999 PR PBB SHADOW E&M-EST. PATIENT-LVL V: ICD-10-PCS | Mod: PBBFAC,,, | Performed by: NURSE PRACTITIONER

## 2023-09-15 PROCEDURE — 3008F BODY MASS INDEX DOCD: CPT | Mod: CPTII,S$GLB,, | Performed by: NURSE PRACTITIONER

## 2023-09-15 PROCEDURE — 3075F PR MOST RECENT SYSTOLIC BLOOD PRESS GE 130-139MM HG: ICD-10-PCS | Mod: CPTII,S$GLB,, | Performed by: NURSE PRACTITIONER

## 2023-09-15 PROCEDURE — 1159F PR MEDICATION LIST DOCUMENTED IN MEDICAL RECORD: ICD-10-PCS | Mod: CPTII,S$GLB,, | Performed by: NURSE PRACTITIONER

## 2023-09-15 PROCEDURE — 1159F MED LIST DOCD IN RCRD: CPT | Mod: CPTII,S$GLB,, | Performed by: NURSE PRACTITIONER

## 2023-09-15 PROCEDURE — 3008F PR BODY MASS INDEX (BMI) DOCUMENTED: ICD-10-PCS | Mod: CPTII,S$GLB,, | Performed by: NURSE PRACTITIONER

## 2023-09-15 PROCEDURE — 1160F RVW MEDS BY RX/DR IN RCRD: CPT | Mod: CPTII,S$GLB,, | Performed by: NURSE PRACTITIONER

## 2023-09-15 RX ORDER — LOSARTAN POTASSIUM AND HYDROCHLOROTHIAZIDE 25; 100 MG/1; MG/1
1 TABLET ORAL DAILY
Qty: 90 TABLET | Refills: 3 | Status: SHIPPED | OUTPATIENT
Start: 2023-09-15 | End: 2024-01-02

## 2023-09-15 RX ORDER — AMLODIPINE BESYLATE 10 MG/1
10 TABLET ORAL DAILY
Qty: 90 TABLET | Refills: 3 | Status: SHIPPED | OUTPATIENT
Start: 2023-09-15 | End: 2024-01-02

## 2023-09-15 RX ORDER — METOPROLOL SUCCINATE 50 MG/1
50 TABLET, EXTENDED RELEASE ORAL DAILY
Qty: 90 TABLET | Refills: 3 | Status: SHIPPED | OUTPATIENT
Start: 2023-09-15 | End: 2023-12-18

## 2023-09-15 RX ORDER — SCOLOPAMINE TRANSDERMAL SYSTEM 1 MG/1
1 PATCH, EXTENDED RELEASE TRANSDERMAL
Qty: 4 PATCH | Refills: 1 | Status: SHIPPED | OUTPATIENT
Start: 2023-09-15

## 2023-09-15 NOTE — PROGRESS NOTES
"Subjective:       Patient ID: Marcus Peterson is a 52 y.o. male.    Chief Complaint: Follow-up    Pt presents to clinic today annual exam  Overall doing well   Taking meds as prescribed  BP a little elevated when he first got to clinic    He is on testosterone replacement through Dr. Topete  Doing well with that    Due for updated labs- has scheduled in Oct     He request nausea patches for upcoming boat trip  Has used them before and helped         /88   Pulse 71   Temp 97.8 °F (36.6 °C) (Tympanic)   Ht 6' 1" (1.854 m)   Wt 112.4 kg (247 lb 12.8 oz)   SpO2 95%   BMI 32.69 kg/m²     Review of Systems   Constitutional:  Negative for activity change, appetite change, chills, diaphoresis, fatigue, fever and unexpected weight change.   HENT: Negative.     Eyes: Negative.    Respiratory:  Negative for apnea, chest tightness, shortness of breath and stridor.    Cardiovascular:  Negative for chest pain, palpitations and leg swelling.   Gastrointestinal: Negative.    Endocrine: Negative.    Genitourinary: Negative.    Musculoskeletal:  Negative for arthralgias and myalgias.   Skin:  Negative for color change, pallor, rash and wound.   Allergic/Immunologic: Negative.    Neurological:  Negative for dizziness, facial asymmetry, light-headedness and headaches.   Hematological:  Negative for adenopathy.   Psychiatric/Behavioral:  Negative for agitation and behavioral problems.        Objective:      Physical Exam  Vitals reviewed.   Constitutional:       General: He is not in acute distress.     Appearance: Normal appearance. He is normal weight.   HENT:      Head: Normocephalic.      Right Ear: Tympanic membrane normal.      Left Ear: Tympanic membrane normal.      Nose: Nose normal.   Eyes:      Conjunctiva/sclera: Conjunctivae normal.      Pupils: Pupils are equal, round, and reactive to light.   Cardiovascular:      Rate and Rhythm: Normal rate.      Pulses: Normal pulses.   Pulmonary:      Effort: Pulmonary " effort is normal.   Abdominal:      General: Abdomen is flat.      Palpations: Abdomen is soft.   Musculoskeletal:         General: Normal range of motion.      Cervical back: Normal range of motion.   Skin:     General: Skin is warm.      Capillary Refill: Capillary refill takes less than 2 seconds.   Neurological:      Mental Status: He is alert and oriented to person, place, and time. Mental status is at baseline.   Psychiatric:         Mood and Affect: Mood normal.         Assessment:       1. Routine general medical examination at health care facility    2. Primary hypertension    3. Hypogonadism in male    4. H/O motion sickness    5. BMI 32.0-32.9,adult        Plan:       Marcus was seen today for follow-up.    Diagnoses and all orders for this visit:    Routine general medical examination at health care facility       - Focus on good health habits, low fat diet, regular exercise, seatbelt use, sunscreen use   Primary hypertension  -     Lipid Panel; Future  -     amLODIPine (NORVASC) 10 MG tablet; Take 1 tablet (10 mg total) by mouth once daily.  -     losartan-hydrochlorothiazide 100-25 mg (HYZAAR) 100-25 mg per tablet; Take 1 tablet by mouth once daily.  -     metoprolol succinate (TOPROL-XL) 50 MG 24 hr tablet; Take 1 tablet (50 mg total) by mouth once daily.  - Chronic, stable continue meds as prescribed    Hypogonadism in male        - Chronic, continue following with Dr. Topete   H/O motion sickness  -     scopolamine (TRANSDERM-SCOP) 1.3-1.5 mg (1 mg over 3 days); Place 1 patch onto the skin every 72 hours.    BMI 32.0-32.9,adult      Vitals reviewed and stable. BP within normal range at 138/88. Labs due. Will update     Patient has been making dietary changes. Patient is to continue eating a well balanced diet.      He is encouraged to engage in exercise outside of his day to day activities.      Questions and concerns addressed.      Follow up in 6 months or PRN.

## 2023-10-06 ENCOUNTER — LAB VISIT (OUTPATIENT)
Dept: LAB | Facility: HOSPITAL | Age: 53
End: 2023-10-06
Attending: UROLOGY
Payer: COMMERCIAL

## 2023-10-06 DIAGNOSIS — I10 PRIMARY HYPERTENSION: ICD-10-CM

## 2023-10-06 DIAGNOSIS — E29.1 HYPOGONADISM IN MALE: ICD-10-CM

## 2023-10-06 LAB
ALBUMIN SERPL BCP-MCNC: 4 G/DL (ref 3.5–5.2)
ALP SERPL-CCNC: 46 U/L (ref 55–135)
ALT SERPL W/O P-5'-P-CCNC: 52 U/L (ref 10–44)
ANION GAP SERPL CALC-SCNC: 8 MMOL/L (ref 8–16)
AST SERPL-CCNC: 33 U/L (ref 10–40)
BILIRUB SERPL-MCNC: 1.2 MG/DL (ref 0.1–1)
BUN SERPL-MCNC: 19 MG/DL (ref 6–20)
CALCIUM SERPL-MCNC: 9.3 MG/DL (ref 8.7–10.5)
CHLORIDE SERPL-SCNC: 105 MMOL/L (ref 95–110)
CHOLEST SERPL-MCNC: 161 MG/DL (ref 120–199)
CHOLEST/HDLC SERPL: 4.2 {RATIO} (ref 2–5)
CO2 SERPL-SCNC: 27 MMOL/L (ref 23–29)
COMPLEXED PSA SERPL-MCNC: 2.5 NG/ML (ref 0–4)
CREAT SERPL-MCNC: 1.2 MG/DL (ref 0.5–1.4)
ERYTHROCYTE [DISTWIDTH] IN BLOOD BY AUTOMATED COUNT: 13.2 % (ref 11.5–14.5)
EST. GFR  (NO RACE VARIABLE): >60 ML/MIN/1.73 M^2
GLUCOSE SERPL-MCNC: 99 MG/DL (ref 70–110)
HCT VFR BLD AUTO: 51.8 % (ref 40–54)
HDLC SERPL-MCNC: 38 MG/DL (ref 40–75)
HDLC SERPL: 23.6 % (ref 20–50)
HGB BLD-MCNC: 17.7 G/DL (ref 14–18)
LDLC SERPL CALC-MCNC: 104.6 MG/DL (ref 63–159)
MCH RBC QN AUTO: 31.3 PG (ref 27–31)
MCHC RBC AUTO-ENTMCNC: 34.2 G/DL (ref 32–36)
MCV RBC AUTO: 92 FL (ref 82–98)
NONHDLC SERPL-MCNC: 123 MG/DL
PLATELET # BLD AUTO: 166 K/UL (ref 150–450)
PMV BLD AUTO: 12.6 FL (ref 9.2–12.9)
POTASSIUM SERPL-SCNC: 4.2 MMOL/L (ref 3.5–5.1)
PROT SERPL-MCNC: 6.9 G/DL (ref 6–8.4)
RBC # BLD AUTO: 5.65 M/UL (ref 4.6–6.2)
SODIUM SERPL-SCNC: 140 MMOL/L (ref 136–145)
TESTOST SERPL-MCNC: 827 NG/DL (ref 304–1227)
TRIGL SERPL-MCNC: 92 MG/DL (ref 30–150)
WBC # BLD AUTO: 6.01 K/UL (ref 3.9–12.7)

## 2023-10-06 PROCEDURE — 84153 ASSAY OF PSA TOTAL: CPT | Performed by: UROLOGY

## 2023-10-06 PROCEDURE — 36415 COLL VENOUS BLD VENIPUNCTURE: CPT | Mod: PN | Performed by: UROLOGY

## 2023-10-06 PROCEDURE — 85027 COMPLETE CBC AUTOMATED: CPT | Performed by: UROLOGY

## 2023-10-06 PROCEDURE — 80061 LIPID PANEL: CPT | Performed by: NURSE PRACTITIONER

## 2023-10-06 PROCEDURE — 84403 ASSAY OF TOTAL TESTOSTERONE: CPT | Performed by: UROLOGY

## 2023-10-06 PROCEDURE — 80053 COMPREHEN METABOLIC PANEL: CPT | Performed by: UROLOGY

## 2023-10-11 ENCOUNTER — TELEPHONE (OUTPATIENT)
Dept: UROLOGY | Facility: CLINIC | Age: 53
End: 2023-10-11
Payer: COMMERCIAL

## 2023-10-12 ENCOUNTER — TELEPHONE (OUTPATIENT)
Dept: UROLOGY | Facility: CLINIC | Age: 53
End: 2023-10-12
Payer: COMMERCIAL

## 2023-12-18 ENCOUNTER — OFFICE VISIT (OUTPATIENT)
Dept: INTERNAL MEDICINE | Facility: CLINIC | Age: 53
End: 2023-12-18
Payer: COMMERCIAL

## 2023-12-18 VITALS
TEMPERATURE: 97 F | HEART RATE: 73 BPM | DIASTOLIC BLOOD PRESSURE: 100 MMHG | BODY MASS INDEX: 33.57 KG/M2 | WEIGHT: 254.44 LBS | SYSTOLIC BLOOD PRESSURE: 152 MMHG | OXYGEN SATURATION: 96 %

## 2023-12-18 DIAGNOSIS — I10 PRIMARY HYPERTENSION: Primary | ICD-10-CM

## 2023-12-18 DIAGNOSIS — R42 DIZZINESS: ICD-10-CM

## 2023-12-18 PROCEDURE — 1160F PR REVIEW ALL MEDS BY PRESCRIBER/CLIN PHARMACIST DOCUMENTED: ICD-10-PCS | Mod: CPTII,S$GLB,, | Performed by: NURSE PRACTITIONER

## 2023-12-18 PROCEDURE — 3080F DIAST BP >= 90 MM HG: CPT | Mod: CPTII,S$GLB,, | Performed by: NURSE PRACTITIONER

## 2023-12-18 PROCEDURE — 3008F BODY MASS INDEX DOCD: CPT | Mod: CPTII,S$GLB,, | Performed by: NURSE PRACTITIONER

## 2023-12-18 PROCEDURE — 99214 OFFICE O/P EST MOD 30 MIN: CPT | Mod: S$GLB,,, | Performed by: NURSE PRACTITIONER

## 2023-12-18 PROCEDURE — 99999 PR PBB SHADOW E&M-EST. PATIENT-LVL V: CPT | Mod: PBBFAC,,, | Performed by: NURSE PRACTITIONER

## 2023-12-18 PROCEDURE — 3008F PR BODY MASS INDEX (BMI) DOCUMENTED: ICD-10-PCS | Mod: CPTII,S$GLB,, | Performed by: NURSE PRACTITIONER

## 2023-12-18 PROCEDURE — 3077F PR MOST RECENT SYSTOLIC BLOOD PRESSURE >= 140 MM HG: ICD-10-PCS | Mod: CPTII,S$GLB,, | Performed by: NURSE PRACTITIONER

## 2023-12-18 PROCEDURE — 1159F MED LIST DOCD IN RCRD: CPT | Mod: CPTII,S$GLB,, | Performed by: NURSE PRACTITIONER

## 2023-12-18 PROCEDURE — 1159F PR MEDICATION LIST DOCUMENTED IN MEDICAL RECORD: ICD-10-PCS | Mod: CPTII,S$GLB,, | Performed by: NURSE PRACTITIONER

## 2023-12-18 PROCEDURE — 3080F PR MOST RECENT DIASTOLIC BLOOD PRESSURE >= 90 MM HG: ICD-10-PCS | Mod: CPTII,S$GLB,, | Performed by: NURSE PRACTITIONER

## 2023-12-18 PROCEDURE — 99999 PR PBB SHADOW E&M-EST. PATIENT-LVL V: ICD-10-PCS | Mod: PBBFAC,,, | Performed by: NURSE PRACTITIONER

## 2023-12-18 PROCEDURE — 1160F RVW MEDS BY RX/DR IN RCRD: CPT | Mod: CPTII,S$GLB,, | Performed by: NURSE PRACTITIONER

## 2023-12-18 PROCEDURE — 99214 PR OFFICE/OUTPT VISIT, EST, LEVL IV, 30-39 MIN: ICD-10-PCS | Mod: S$GLB,,, | Performed by: NURSE PRACTITIONER

## 2023-12-18 PROCEDURE — 3077F SYST BP >= 140 MM HG: CPT | Mod: CPTII,S$GLB,, | Performed by: NURSE PRACTITIONER

## 2023-12-18 RX ORDER — METOPROLOL SUCCINATE 100 MG/1
100 TABLET, EXTENDED RELEASE ORAL DAILY
Qty: 90 TABLET | Refills: 0 | Status: SHIPPED | OUTPATIENT
Start: 2023-12-18 | End: 2024-01-02

## 2023-12-18 RX ORDER — MECLIZINE HYDROCHLORIDE 25 MG/1
25 TABLET ORAL 3 TIMES DAILY PRN
COMMUNITY
Start: 2023-12-11

## 2023-12-18 NOTE — PROGRESS NOTES
Subjective:       Patient ID: Marcus Peterson is a 53 y.o. male.    Chief Complaint: Follow-up    Pt presents to clinic today for ER follow up  On Monday had episode of extreme dizziness and was seen at [facility] ER  Negative workup- dx with vertigo  His BP was really high during his ER stay  Vertigo has since resolved however home BP readings still elevated   He is pretty complaint with his BP meds  Takes norvasc, losartan/hctz and metoprolol   Previously pretty well controlled   Strong family history of CAD        BP (!) 152/100   Pulse 73   Temp 96.8 °F (36 °C)   Wt 115.4 kg (254 lb 6.6 oz)   SpO2 96%   BMI 33.57 kg/m²     Review of Systems   Constitutional:  Negative for activity change, appetite change, chills, diaphoresis, fatigue, fever and unexpected weight change.   HENT: Negative.     Eyes: Negative.    Respiratory:  Negative for apnea, chest tightness, shortness of breath and stridor.    Cardiovascular:  Negative for chest pain, palpitations and leg swelling.   Gastrointestinal: Negative.    Endocrine: Negative.    Genitourinary: Negative.    Musculoskeletal:  Negative for arthralgias and myalgias.   Skin:  Negative for color change, pallor, rash and wound.   Allergic/Immunologic: Negative.    Neurological:  Positive for dizziness. Negative for facial asymmetry, light-headedness and headaches.   Hematological:  Negative for adenopathy.   Psychiatric/Behavioral:  Negative for agitation and behavioral problems.        Objective:      Physical Exam  Vitals and nursing note reviewed.   Constitutional:       General: He is not in acute distress.     Appearance: He is well-developed. He is obese. He is not diaphoretic.   HENT:      Head: Normocephalic and atraumatic.   Cardiovascular:      Rate and Rhythm: Normal rate and regular rhythm.      Heart sounds: Normal heart sounds.   Pulmonary:      Effort: Pulmonary effort is normal. No respiratory distress.      Breath sounds: Normal breath sounds.   Skin:      General: Skin is warm and dry.      Findings: No rash.   Neurological:      Mental Status: He is alert and oriented to person, place, and time.   Psychiatric:         Behavior: Behavior normal.         Thought Content: Thought content normal.         Judgment: Judgment normal.         Assessment:       1. Primary hypertension    2. Dizziness    3. BMI 33.0-33.9,adult        Plan:       Marcus was seen today for follow-up.    Diagnoses and all orders for this visit:    Primary hypertension  -     Ambulatory referral/consult to Cardiology; Future  -     metoprolol succinate (TOPROL-XL) 100 MG 24 hr tablet; Take 1 tablet (100 mg total) by mouth once daily.    Dizziness     - Family reports ER rec possible MRI of the brain. We will monitor his symptoms. Should the dizziness or any other concerns arise, we will order MRI at that point.    BMI 33.0-33.9,adult      Pt request referral to cardio due to family history  Will increase his metoprolol today   Continue to monitor BP  Work on weight loss like we discussed, dietary modifications advised  We will follow up with pt in 1 month for BP check and PRN

## 2023-12-26 ENCOUNTER — PATIENT MESSAGE (OUTPATIENT)
Dept: UROLOGY | Facility: CLINIC | Age: 53
End: 2023-12-26
Payer: COMMERCIAL

## 2023-12-28 DIAGNOSIS — I10 PRIMARY HYPERTENSION: Primary | ICD-10-CM

## 2024-01-02 ENCOUNTER — OFFICE VISIT (OUTPATIENT)
Dept: CARDIOLOGY | Facility: CLINIC | Age: 54
End: 2024-01-02
Payer: COMMERCIAL

## 2024-01-02 ENCOUNTER — HOSPITAL ENCOUNTER (OUTPATIENT)
Dept: CARDIOLOGY | Facility: HOSPITAL | Age: 54
Discharge: HOME | End: 2024-01-02
Attending: STUDENT IN AN ORGANIZED HEALTH CARE EDUCATION/TRAINING PROGRAM
Payer: COMMERCIAL

## 2024-01-02 VITALS
HEIGHT: 72 IN | BODY MASS INDEX: 34.82 KG/M2 | SYSTOLIC BLOOD PRESSURE: 154 MMHG | HEART RATE: 67 BPM | OXYGEN SATURATION: 95 % | WEIGHT: 257.06 LBS | DIASTOLIC BLOOD PRESSURE: 110 MMHG

## 2024-01-02 DIAGNOSIS — I10 PRIMARY HYPERTENSION: ICD-10-CM

## 2024-01-02 DIAGNOSIS — E66.9 OBESITY (BMI 30.0-34.9): Primary | ICD-10-CM

## 2024-01-02 PROBLEM — E66.811 OBESITY (BMI 30.0-34.9): Status: ACTIVE | Noted: 2024-01-02

## 2024-01-02 PROCEDURE — 93005 ELECTROCARDIOGRAM TRACING: CPT | Mod: PO

## 2024-01-02 PROCEDURE — 3080F DIAST BP >= 90 MM HG: CPT | Mod: CPTII,S$GLB,, | Performed by: STUDENT IN AN ORGANIZED HEALTH CARE EDUCATION/TRAINING PROGRAM

## 2024-01-02 PROCEDURE — 99999 PR PBB SHADOW E&M-EST. PATIENT-LVL IV: CPT | Mod: PBBFAC,,, | Performed by: STUDENT IN AN ORGANIZED HEALTH CARE EDUCATION/TRAINING PROGRAM

## 2024-01-02 PROCEDURE — 3008F BODY MASS INDEX DOCD: CPT | Mod: CPTII,S$GLB,, | Performed by: STUDENT IN AN ORGANIZED HEALTH CARE EDUCATION/TRAINING PROGRAM

## 2024-01-02 PROCEDURE — 99204 OFFICE O/P NEW MOD 45 MIN: CPT | Mod: S$GLB,,, | Performed by: STUDENT IN AN ORGANIZED HEALTH CARE EDUCATION/TRAINING PROGRAM

## 2024-01-02 PROCEDURE — 93010 ELECTROCARDIOGRAM REPORT: CPT | Mod: ,,, | Performed by: INTERNAL MEDICINE

## 2024-01-02 PROCEDURE — 1159F MED LIST DOCD IN RCRD: CPT | Mod: CPTII,S$GLB,, | Performed by: STUDENT IN AN ORGANIZED HEALTH CARE EDUCATION/TRAINING PROGRAM

## 2024-01-02 PROCEDURE — 3077F SYST BP >= 140 MM HG: CPT | Mod: CPTII,S$GLB,, | Performed by: STUDENT IN AN ORGANIZED HEALTH CARE EDUCATION/TRAINING PROGRAM

## 2024-01-02 RX ORDER — NEBIVOLOL 10 MG/1
10 TABLET ORAL DAILY
Qty: 30 TABLET | Refills: 6 | Status: SHIPPED | OUTPATIENT
Start: 2024-01-02 | End: 2024-02-20

## 2024-01-02 RX ORDER — NIFEDIPINE 60 MG/1
60 TABLET, EXTENDED RELEASE ORAL DAILY
Qty: 30 TABLET | Refills: 6 | Status: SHIPPED | OUTPATIENT
Start: 2024-01-02 | End: 2024-02-20 | Stop reason: SDUPTHER

## 2024-01-02 RX ORDER — TELMISARTAN AND HYDROCHLORTHIAZIDE 80; 25 MG/1; MG/1
1 TABLET ORAL DAILY
Qty: 30 TABLET | Refills: 6 | Status: SHIPPED | OUTPATIENT
Start: 2024-01-02 | End: 2025-01-01

## 2024-01-02 NOTE — PROGRESS NOTES
Section of Cardiology                  Cardiac Clinic Note    Chief Complaint/Reason for consultation: high blood pressure      HPI:   Marcus Peterson is a 53 y.o. male with h/o HTN, obesity who comes in to cardiology for evaluation.    25  Comes in with wife  Was seen in the ED 23 with dizziness, BP noted to be high   CTH negative   Was given meclizine, thought to be vertigo related- resolved   No chest pain, SOB  Exercising more now   Travels a lot for work, eats out a good bit  Eats snacks, not big on sweets   Lost weight to 238 lbs now 257 lbs   No longer on semiglutide (generic)    Says meds not controlling- compliant with taking  Does not take BP regularly at home  Metoprolol xl recently increased    ETOH- wine daily (1 bottle daily)  Denies tobacco abuse       Family hx: HTN- mom and dad  Mom-  of brain aneurysm, mom side of family of heart issues with aunt and uncle    EKG 24 NSR, no acute ST - T wave changes    ECHO  No results found for this or any previous visit.       STRESS TEST No results found for this or any previous visit.       LHC No results found for this or any previous visit.            ROS: All 10 systems reviewed. Please refer to the HPI for pertinent positives. All other systems negative.     Past Medical History  Past Medical History:   Diagnosis Date    Hypertension     Hypogonadism in male        Surgical History  Past Surgical History:   Procedure Laterality Date    COLONOSCOPY N/A 2022    Procedure: COLONOSCOPY;  Surgeon: Marcia Acevedo MD;  Location: [facility];  Service: Endoscopy;  Laterality: N/A;    EYE SURGERY      KNEE SURGERY            Allergies:   Review of patient's allergies indicates:  No Known Allergies    Social History:  Social History     Socioeconomic History    Marital status:     Number of children: 3   Occupational History    Occupation:    Tobacco Use    Smoking status: Former     Current packs/day: 0.00  "    Average packs/day: 0.5 packs/day for 5.0 years (2.5 ttl pk-yrs)     Types: Cigarettes     Start date:      Quit date:      Years since quittin.0    Smokeless tobacco: Never   Substance and Sexual Activity    Alcohol use: Yes     Alcohol/week: 10.0 standard drinks of alcohol     Types: 10 Glasses of wine per week    Drug use: Never    Sexual activity: Yes     Partners: Female       Family History:  family history includes Aneurysm in his mother; Arthritis in his mother; Cancer in his paternal grandmother; Depression in his mother; Hypertension in his father and mother; Muscular dystrophy in his paternal grandfather.    Home Medications:  Current Outpatient Medications on File Prior to Visit   Medication Sig Dispense Refill    [DISCONTINUED] amLODIPine (NORVASC) 10 MG tablet Take 1 tablet (10 mg total) by mouth once daily. 90 tablet 3    [DISCONTINUED] losartan-hydrochlorothiazide 100-25 mg (HYZAAR) 100-25 mg per tablet Take 1 tablet by mouth once daily. 90 tablet 3    [DISCONTINUED] metoprolol succinate (TOPROL-XL) 100 MG 24 hr tablet Take 1 tablet (100 mg total) by mouth once daily. 90 tablet 0    meclizine (ANTIVERT) 25 mg tablet Take 25 mg by mouth 3 (three) times daily as needed.      moxifloxacin (VIGAMOX) 0.5 % ophthalmic solution       needle, disp, 18 G (HYPODERMIC NEEDLES) 18 gauge x 1 1/2" Ndle Use as directed 12 each 4    needle, disp, 18 G 18 gauge x 1" Ndle Use as directed 4 each 12    needle, disp, 25 gauge 25 gauge x 1 1/2" Ndle Use as directed 12 each 4    prednisoLONE acetate (PRED FORTE) 1 % DrpS       safety needles (ECLIPSE NEEDLE) 23 gauge x 1" Ndle Use as directed 4 each 12    scopolamine (TRANSDERM-SCOP) 1.3-1.5 mg (1 mg over 3 days) Place 1 patch onto the skin every 72 hours. 4 patch 1    syringe, disposable, 3 mL Syrg Use as directed 12 each 4    syringe, disposable, 3 mL Syrg Use as directed 12 each 4    testosterone cypionate (DEPOTESTOTERONE CYPIONATE) 200 mg/mL injection " "Inject 0.6 mLs (120 mg total) into the muscle every 7 days. 4 mL 5     Current Facility-Administered Medications on File Prior to Visit   Medication Dose Route Frequency Provider Last Rate Last Admin    lactated ringers infusion   Intravenous Continuous Lamin Mckeon MD 10 mL/hr at 09/16/22 1140 Restarted at 09/16/22 1202       Physical exam:  BP (!) 154/110   Pulse 67   Ht 6' (1.829 m)   Wt 116.6 kg (257 lb 0.9 oz)   SpO2 95%   BMI 34.86 kg/m²         General: Pt is a 53 y.o. year old male who is AAOx3, in NAD, is pleasant, well nourished, looks stated age  HEENT: PERRL, EOMI, Oral mucosa pink & moist  CVS  No abnormal cardiac pulsations noted on inspection. JVP not raised. The apical impulse is normal on palpation, and is located in the left 5th intercostal space in the mid - clavicular line. No palpable thrills or abnormal pulsations noted. RR, S1 - S2 heard, no murmurs, rubs or gallops appreciated.   PUL : CTA B/L. No wheezes/crackles heard   ABD : BS +, soft. No tenderness elicited   LE : No C/C/E. Distal Pulses palpable B/L         LABS:    Chemistry:   Lab Results   Component Value Date     10/06/2023    K 4.2 10/06/2023     10/06/2023    CO2 27 10/06/2023    BUN 19 10/06/2023    CREATININE 1.2 10/06/2023    CALCIUM 9.3 10/06/2023     Cardiac Markers:   Lab Results   Component Value Date    TROPONINI 0.02 12/11/2023     Cardiac Markers (Last 3):   Lab Results   Component Value Date    TROPONINI 0.02 12/11/2023     CBC:   Lab Results   Component Value Date    WBC 6.01 10/06/2023    HGB 17.7 10/06/2023    HCT 51.8 10/06/2023    MCV 92 10/06/2023     10/06/2023     Lipids:   Lab Results   Component Value Date    CHOL 161 10/06/2023    TRIG 92 10/06/2023    HDL 38 (L) 10/06/2023     Coagulation: No results found for: "PT", "INR", "APTT"        Assessment    1. Obesity (BMI 30.0-34.9)    2. Primary hypertension         Plan:      HTN  Elevated  Switch losartan/hctz to " telmisartan/hctz 80/25  Switch amlodipine to nifedipine 60  Toprol xl to bystolic 10    Obesity, Body mass index is 34.86 kg/m².   Low salt, low fat diet  Exercise as tolerated, at least 30 min daily   Trying to get back on bystolic     This note was prepared using voice recognition system and is likely to have sound alike errors that may have been overlooked even after proofreading.     I have reviewed all pertinent chart information.  Plans and recommendations have been formulated under my direct supervision. All questions answered and patient voiced understanding.   If symptoms persist go to the ED.    RTC in 1 month         Christina Steiner MD  Cardiology

## 2024-01-11 RX ORDER — TESTOSTERONE CYPIONATE 200 MG/ML
120 INJECTION, SOLUTION INTRAMUSCULAR
Qty: 4 ML | Refills: 1 | Status: SHIPPED | OUTPATIENT
Start: 2024-01-11 | End: 2024-01-16 | Stop reason: SDUPTHER

## 2024-01-16 ENCOUNTER — OFFICE VISIT (OUTPATIENT)
Dept: UROLOGY | Facility: CLINIC | Age: 54
End: 2024-01-16
Payer: COMMERCIAL

## 2024-01-16 VITALS
HEART RATE: 71 BPM | DIASTOLIC BLOOD PRESSURE: 93 MMHG | HEIGHT: 72 IN | WEIGHT: 257.94 LBS | SYSTOLIC BLOOD PRESSURE: 145 MMHG | RESPIRATION RATE: 18 BRPM | BODY MASS INDEX: 34.94 KG/M2

## 2024-01-16 DIAGNOSIS — N40.2 PROSTATE NODULE: ICD-10-CM

## 2024-01-16 DIAGNOSIS — E29.1 HYPOGONADISM IN MALE: Primary | ICD-10-CM

## 2024-01-16 DIAGNOSIS — N52.2 DRUG-INDUCED ERECTILE DYSFUNCTION: ICD-10-CM

## 2024-01-16 PROCEDURE — 99999 PR PBB SHADOW E&M-EST. PATIENT-LVL IV: CPT | Mod: PBBFAC,,, | Performed by: UROLOGY

## 2024-01-16 PROCEDURE — 99214 OFFICE O/P EST MOD 30 MIN: CPT | Mod: S$GLB,,, | Performed by: UROLOGY

## 2024-01-16 PROCEDURE — 1159F MED LIST DOCD IN RCRD: CPT | Mod: CPTII,S$GLB,, | Performed by: UROLOGY

## 2024-01-16 PROCEDURE — 3080F DIAST BP >= 90 MM HG: CPT | Mod: CPTII,S$GLB,, | Performed by: UROLOGY

## 2024-01-16 PROCEDURE — 3077F SYST BP >= 140 MM HG: CPT | Mod: CPTII,S$GLB,, | Performed by: UROLOGY

## 2024-01-16 PROCEDURE — 3008F BODY MASS INDEX DOCD: CPT | Mod: CPTII,S$GLB,, | Performed by: UROLOGY

## 2024-01-16 RX ORDER — TADALAFIL 20 MG/1
TABLET ORAL
Qty: 30 TABLET | Refills: 7 | Status: SHIPPED | OUTPATIENT
Start: 2024-01-16 | End: 2024-01-16 | Stop reason: SDUPTHER

## 2024-01-16 RX ORDER — TESTOSTERONE CYPIONATE 200 MG/ML
120 INJECTION, SOLUTION INTRAMUSCULAR
Qty: 4 ML | Refills: 5 | Status: SHIPPED | OUTPATIENT
Start: 2024-01-16 | End: 2024-01-16 | Stop reason: SDUPTHER

## 2024-01-16 RX ORDER — TADALAFIL 20 MG/1
TABLET ORAL
Qty: 60 TABLET | Refills: 7 | Status: SHIPPED | OUTPATIENT
Start: 2024-01-16

## 2024-01-16 RX ORDER — TESTOSTERONE CYPIONATE 200 MG/ML
120 INJECTION, SOLUTION INTRAMUSCULAR
Qty: 4 ML | Refills: 5 | Status: SHIPPED | OUTPATIENT
Start: 2024-01-16 | End: 2024-02-20 | Stop reason: SDUPTHER

## 2024-01-16 RX ORDER — LEVOFLOXACIN 500 MG/1
TABLET, FILM COATED ORAL
Qty: 1 TABLET | Refills: 0 | Status: SHIPPED | OUTPATIENT
Start: 2024-01-16 | End: 2024-03-22 | Stop reason: ALTCHOICE

## 2024-01-16 NOTE — PROGRESS NOTES
CC: hypogonadism    History of Present Illness:     1/16/24-Energy is improved on testosterone. States that new BP meds have significantly affected his erections. He has taken cialis in the past and it has been helpful. Good stream. No hematuria or dysuria.   4/14/23-patient has run out of T and energy and T are low. Would like to restart. Has lost 40 pounds from Intermittent Fasting over the past several months. Libido is low. No bothersome LUTS.   10/14/22-50yo male here for evaluation of low T. Was previously on testosterone when he lived in [place]. Has been off for 8-9 months. Since that time, he reports that he has had a 30 pound fat gain. He has also started HTN meds. Now, he reports low libido. Has difficulty maintaining erections. Low energy. Stream is a little weaker and he has a little post-void dribble.   He was on T cypionate 200mg/mL 0.6mL Q7 days. Was on it for about 4 years.        Review of Systems   Constitutional:  Negative for chills and fever.   Respiratory:  Negative for shortness of breath.    Cardiovascular:  Negative for chest pain.   Gastrointestinal:  Negative for abdominal pain.   All other systems reviewed and are negative.        Past Medical History:   Diagnosis Date    Hypertension     Hypogonadism in male        Past Surgical History:   Procedure Laterality Date    COLONOSCOPY N/A 09/16/2022    Procedure: COLONOSCOPY;  Surgeon: Marcia Acevedo MD;  Location: [facility];  Service: Endoscopy;  Laterality: N/A;    EYE SURGERY      KNEE SURGERY         Family History   Problem Relation Age of Onset    Arthritis Mother     Depression Mother     Hypertension Mother     Aneurysm Mother     Hypertension Father     Cancer Paternal Grandmother     Muscular dystrophy Paternal Grandfather        Social History     Tobacco Use    Smoking status: Former     Current packs/day: 0.00     Average packs/day: 0.5 packs/day for 5.0 years (2.5 ttl pk-yrs)     Types: Cigarettes     Start date: 1996      "Quit date:      Years since quittin.0    Smokeless tobacco: Never   Substance Use Topics    Alcohol use: Yes     Alcohol/week: 10.0 standard drinks of alcohol     Types: 10 Glasses of wine per week    Drug use: Never       Current Outpatient Medications   Medication Sig Dispense Refill    meclizine (ANTIVERT) 25 mg tablet Take 25 mg by mouth 3 (three) times daily as needed.      moxifloxacin (VIGAMOX) 0.5 % ophthalmic solution       nebivoloL (BYSTOLIC) 10 MG Tab Take 1 tablet (10 mg total) by mouth once daily. 30 tablet 6    needle, disp, 18 G (HYPODERMIC NEEDLES) 18 gauge x 1 1/2" Ndle Use as directed 12 each 4    needle, disp, 18 G 18 gauge x 1" Ndle Use as directed 4 each 12    needle, disp, 25 gauge 25 gauge x 1 1/2" Ndle Use as directed 12 each 4    NIFEdipine (PROCARDIA-XL) 60 MG (OSM) 24 hr tablet Take 1 tablet (60 mg total) by mouth once daily. 30 tablet 6    prednisoLONE acetate (PRED FORTE) 1 % DrpS       safety needles (ECLIPSE NEEDLE) 23 gauge x 1" Ndle Use as directed 4 each 12    scopolamine (TRANSDERM-SCOP) 1.3-1.5 mg (1 mg over 3 days) Place 1 patch onto the skin every 72 hours. 4 patch 1    syringe, disposable, 3 mL Syrg Use as directed 12 each 4    syringe, disposable, 3 mL Syrg Use as directed 12 each 4    telmisartan-hydrochlorothiazide (MICARDIS HCT) 80-25 mg per tablet Take 1 tablet by mouth once daily. 30 tablet 6    levoFLOXacin (LEVAQUIN) 500 MG tablet Take 1 po 1 hour before biopsy 1 tablet 0    tadalafiL (CIALIS) 20 MG Tab Take 1 po 1.5 hours before intercourse 60 tablet 7    testosterone cypionate (DEPOTESTOTERONE CYPIONATE) 200 mg/mL injection Inject 0.6 mLs (120 mg total) into the muscle every 7 days. 4 mL 5     No current facility-administered medications for this visit.     Facility-Administered Medications Ordered in Other Visits   Medication Dose Route Frequency Provider Last Rate Last Admin    lactated ringers infusion   Intravenous Continuous Lamin Mckeon MD 10 " mL/hr at 09/16/22 1140 Restarted at 09/16/22 1202       Review of patient's allergies indicates:  No Known Allergies    Physical Exam  Vitals:    01/16/24 1354   BP: (!) 145/93   Pulse: 71   Resp: 18         General: Well-developed, well-nourished in no acute distress  HEENT: Normocephalic, atraumatic, Extraocular movements intact  Neck: supple, trachea midline, no cervical or supraclavicular lymphadenopathy  Respirations: even and unlabored  Back: midline spine, no CVA tenderness  : 10/14/22-circumcised male phallus without lesions, orthotopic urethral meatus, no inguinal hernia, no inguinal lymphadenopathy, no scrotal lesions, testicles descended bilaterally without mass or tenderness  Rectal: 1/16/24-30g prostate, nodule at the left apex, non-tender  Extremities: atraumatic, moves all equally, no clubbing, cyanosis or edema  Psych: normal affect  Skin: warm and dry, no lesions  Neuro: Alert and oriented, Cranial nerves II-XII grossly intact    Testosterone:   4/10/23: 397   10/6/24: 827    PSA  10/6/23: 2.5    Lab Results   Component Value Date    ALT 52 (H) 10/06/2023    AST 33 10/06/2023    ALKPHOS 46 (L) 10/06/2023    BILITOT 1.2 (H) 10/06/2023     Lab Results   Component Value Date    WBC 6.01 10/06/2023    HGB 17.7 10/06/2023    HCT 51.8 10/06/2023    MCV 92 10/06/2023     10/06/2023           Assessment:   1. Hypogonadism in male        2. Prostate nodule        3. Drug-induced erectile dysfunction                Plan:  Hypogonadism in male    Prostate nodule    Drug-induced erectile dysfunction    Other orders  -     Discontinue: tadalafiL (CIALIS) 20 MG Tab; Take 1 po 1.5 hours before intercourse  Dispense: 30 tablet; Refill: 7  -     Discontinue: testosterone cypionate (DEPOTESTOTERONE CYPIONATE) 200 mg/mL injection; Inject 0.6 mLs (120 mg total) into the muscle every 7 days.  Dispense: 4 mL; Refill: 5  -     tadalafiL (CIALIS) 20 MG Tab; Take 1 po 1.5 hours before intercourse  Dispense: 60  tablet; Refill: 7  -     levoFLOXacin (LEVAQUIN) 500 MG tablet; Take 1 po 1 hour before biopsy  Dispense: 1 tablet; Refill: 0  -     testosterone cypionate (DEPOTESTOTERONE CYPIONATE) 200 mg/mL injection; Inject 0.6 mLs (120 mg total) into the muscle every 7 days.  Dispense: 4 mL; Refill: 5    Discussed prostate nodule and my recommendation for TRUS biopsy. Pt to hold off on further testosterone injections until after TRUS biopsy results return.     Follow up for TRUS biopsy.

## 2024-02-07 ENCOUNTER — PROCEDURE VISIT (OUTPATIENT)
Dept: UROLOGY | Facility: CLINIC | Age: 54
End: 2024-02-07
Payer: COMMERCIAL

## 2024-02-07 VITALS
WEIGHT: 263.88 LBS | DIASTOLIC BLOOD PRESSURE: 97 MMHG | SYSTOLIC BLOOD PRESSURE: 151 MMHG | HEIGHT: 72 IN | HEART RATE: 62 BPM | BODY MASS INDEX: 35.74 KG/M2 | RESPIRATION RATE: 18 BRPM | OXYGEN SATURATION: 96 %

## 2024-02-07 DIAGNOSIS — N40.2 PROSTATE NODULE: Primary | ICD-10-CM

## 2024-02-07 DIAGNOSIS — E29.1 HYPOGONADISM IN MALE: ICD-10-CM

## 2024-02-07 LAB
BILIRUB UR QL STRIP: NEGATIVE
GLUCOSE UR QL STRIP: NEGATIVE
KETONES UR QL STRIP: NEGATIVE
LEUKOCYTE ESTERASE UR QL STRIP: NEGATIVE
PH, POC UA: 7
POC BLOOD, URINE: NEGATIVE
POC NITRATES, URINE: NEGATIVE
PROT UR QL STRIP: NEGATIVE
SP GR UR STRIP: 1.01 (ref 1–1.03)
UROBILINOGEN UR STRIP-ACNC: 0.2 (ref 0.3–2.2)

## 2024-02-07 PROCEDURE — 55700 PR BIOPSY OF PROSTATE,NEEDLE/PUNCH: CPT | Mod: S$GLB,,, | Performed by: UROLOGY

## 2024-02-07 PROCEDURE — 88305 TISSUE EXAM BY PATHOLOGIST: CPT | Mod: 59 | Performed by: PATHOLOGY

## 2024-02-07 PROCEDURE — 76872 US TRANSRECTAL: CPT | Mod: 26,S$GLB,, | Performed by: UROLOGY

## 2024-02-07 PROCEDURE — 88305 TISSUE EXAM BY PATHOLOGIST: CPT | Mod: 26,,, | Performed by: PATHOLOGY

## 2024-02-07 PROCEDURE — 96372 THER/PROPH/DIAG INJ SC/IM: CPT | Mod: 59,S$GLB,, | Performed by: UROLOGY

## 2024-02-07 PROCEDURE — 81003 URINALYSIS AUTO W/O SCOPE: CPT | Mod: QW,S$GLB,, | Performed by: UROLOGY

## 2024-02-07 RX ORDER — LIDOCAINE HYDROCHLORIDE 20 MG/ML
JELLY TOPICAL
Status: COMPLETED | OUTPATIENT
Start: 2024-02-07 | End: 2024-02-07

## 2024-02-07 RX ORDER — CEFTRIAXONE 1 G/1
1 INJECTION, POWDER, FOR SOLUTION INTRAMUSCULAR; INTRAVENOUS
Status: COMPLETED | OUTPATIENT
Start: 2024-02-07 | End: 2024-02-07

## 2024-02-07 RX ADMIN — LIDOCAINE HYDROCHLORIDE 10 ML: 20 JELLY TOPICAL at 10:02

## 2024-02-07 RX ADMIN — CEFTRIAXONE 1 G: 1 INJECTION, POWDER, FOR SOLUTION INTRAMUSCULAR; INTRAVENOUS at 11:02

## 2024-02-07 NOTE — PROCEDURES
CC: prostate nodule    History of Present Illness:       2/7/24-TRUS biopsy today. 46cc prostate.   1/16/24-Energy is improved on testosterone. States that new BP meds have significantly affected his erections. He has taken cialis in the past and it has been helpful. Good stream. No hematuria or dysuria.   4/14/23-patient has run out of T and energy and T are low. Would like to restart. Has lost 40 pounds from Intermittent Fasting over the past several months. Libido is low. No bothersome LUTS.   10/14/22-52yo male here for evaluation of low T. Was previously on testosterone when he lived in [place]. Has been off for 8-9 months. Since that time, he reports that he has had a 30 pound fat gain. He has also started HTN meds. Now, he reports low libido. Has difficulty maintaining erections. Low energy. Stream is a little weaker and he has a little post-void dribble.   He was on T cypionate 200mg/mL 0.6mL Q7 days. Was on it for about 4 years.        Review of Systems   Constitutional:  Negative for chills and fever.   Respiratory:  Negative for shortness of breath.    Cardiovascular:  Negative for chest pain.   Gastrointestinal:  Negative for abdominal pain.   All other systems reviewed and are negative.        Past Medical History:   Diagnosis Date    Hypertension     Hypogonadism in male        Past Surgical History:   Procedure Laterality Date    COLONOSCOPY N/A 09/16/2022    Procedure: COLONOSCOPY;  Surgeon: Marcia Acevedo MD;  Location: [facility];  Service: Endoscopy;  Laterality: N/A;    EYE SURGERY      KNEE SURGERY         Family History   Problem Relation Age of Onset    Arthritis Mother     Depression Mother     Hypertension Mother     Aneurysm Mother     Hypertension Father     Cancer Paternal Grandmother     Muscular dystrophy Paternal Grandfather        Social History     Tobacco Use    Smoking status: Former     Current packs/day: 0.00     Average packs/day: 0.5 packs/day for 5.0 years (2.5 ttl pk-yrs)     " Types: Cigarettes     Start date:      Quit date:      Years since quittin.1    Smokeless tobacco: Never   Substance Use Topics    Alcohol use: Yes     Alcohol/week: 10.0 standard drinks of alcohol     Types: 10 Glasses of wine per week    Drug use: Never       Current Outpatient Medications   Medication Sig Dispense Refill    levoFLOXacin (LEVAQUIN) 500 MG tablet Take 1 po 1 hour before biopsy 1 tablet 0    meclizine (ANTIVERT) 25 mg tablet Take 25 mg by mouth 3 (three) times daily as needed.      moxifloxacin (VIGAMOX) 0.5 % ophthalmic solution       nebivoloL (BYSTOLIC) 10 MG Tab Take 1 tablet (10 mg total) by mouth once daily. 30 tablet 6    needle, disp, 18 G (HYPODERMIC NEEDLES) 18 gauge x 1 1/2" Ndle Use as directed 12 each 4    needle, disp, 18 G 18 gauge x 1" Ndle Use as directed 4 each 12    needle, disp, 25 gauge 25 gauge x 1 1/2" Ndle Use as directed 12 each 4    NIFEdipine (PROCARDIA-XL) 60 MG (OSM) 24 hr tablet Take 1 tablet (60 mg total) by mouth once daily. 30 tablet 6    prednisoLONE acetate (PRED FORTE) 1 % DrpS       safety needles (ECLIPSE NEEDLE) 23 gauge x 1" Ndle Use as directed 4 each 12    scopolamine (TRANSDERM-SCOP) 1.3-1.5 mg (1 mg over 3 days) Place 1 patch onto the skin every 72 hours. 4 patch 1    syringe, disposable, 3 mL Syrg Use as directed 12 each 4    syringe, disposable, 3 mL Syrg Use as directed 12 each 4    tadalafiL (CIALIS) 20 MG Tab Take 1 po 1.5 hours before intercourse 60 tablet 7    telmisartan-hydrochlorothiazide (MICARDIS HCT) 80-25 mg per tablet Take 1 tablet by mouth once daily. 30 tablet 6    testosterone cypionate (DEPOTESTOTERONE CYPIONATE) 200 mg/mL injection Inject 0.6 mLs (120 mg total) into the muscle every 7 days. 4 mL 5     No current facility-administered medications for this visit.     Facility-Administered Medications Ordered in Other Visits   Medication Dose Route Frequency Provider Last Rate Last Admin    lactated ringers infusion   " Intravenous Continuous Lamin Mckeon MD 10 mL/hr at 09/16/22 1140 Restarted at 09/16/22 1202       Review of patient's allergies indicates:  No Known Allergies    Physical Exam  Vitals:    02/07/24 1032   BP: (!) 151/97   Pulse: 62   Resp: 18         General: Well-developed, well-nourished in no acute distress  HEENT: Normocephalic, atraumatic, Extraocular movements intact  Neck: supple, trachea midline, no cervical or supraclavicular lymphadenopathy  Respirations: even and unlabored  Back: midline spine, no CVA tenderness  : 10/14/22-circumcised male phallus without lesions, orthotopic urethral meatus, no inguinal hernia, no inguinal lymphadenopathy, no scrotal lesions, testicles descended bilaterally without mass or tenderness  Rectal: 1/16/24-30g prostate, nodule at the left apex, non-tender  Extremities: atraumatic, moves all equally, no clubbing, cyanosis or edema  Psych: normal affect  Skin: warm and dry, no lesions  Neuro: Alert and oriented, Cranial nerves II-XII grossly intact    Testosterone:   4/10/23: 397   10/6/24: 827    PSA  10/6/23: 2.5    Lab Results   Component Value Date    ALT 52 (H) 10/06/2023    AST 33 10/06/2023    ALKPHOS 46 (L) 10/06/2023    BILITOT 1.2 (H) 10/06/2023     Lab Results   Component Value Date    WBC 6.01 10/06/2023    HGB 17.7 10/06/2023    HCT 51.8 10/06/2023    MCV 92 10/06/2023     10/06/2023         Procedure: (1) Transrectal Prostate Biopsy                      (2) Transrectal ultrasound of prostate                     (3) Ultrasound Guidance of Prostate Biopsy needles      Detail: Antibiotic prophylaxis was provided using rocephin and levaquin. After proper consents were obtained, the patient was prepped and draped in normal fashion in the left lateral decubitus position for TRUS/Bx.  5 ml of lidocaine jelly was instilled in the rectum.  Rectal exam noted a nodule at the medial left apex. The U/S with rectal probe was into the patient's rectum.  A spinal  needle was used and 10ml of 1% lidocaine was instilled on the side of the prostate at the base of the seminal vesicles. Systematic review was performed of the prostate, noting a hypoechoic region at the left medial peripheral zone, mid to apex. The prostate measured to be 46g. Biopsy was then performed using an 18Ga biopsy needle directed at the base, mid and apex bilaterally for a total of 12 cores. The patient tolerated the procedure well. Estimated blood loss was 3cc.       Assessment:   1. Prostate nodule  POCT Urinalysis, Dipstick, Automated, W/O Scope      2. Hypogonadism in male                Plan:  Prostate nodule  -     POCT Urinalysis, Dipstick, Automated, W/O Scope    Hypogonadism in male    Other orders  -     cefTRIAXone injection 1 g  -     LIDOcaine HCl 2% urojet    I had a detailed discussion with the patient regarding post-TRUS biopsy fever and need to go to the ED for admission for IV antibiotics for any temperature above 100.4 degrees.   Pt instructed to continue to hold off on using testosterone until results are back.   Follow up in about 2 weeks (around 2/21/2024).

## 2024-02-07 NOTE — PROGRESS NOTES
Patient came in for scheduled prostate biopsy, Dr. Topete ordered Rocephin 1gm injection to prevent infection, Rocephin 1gm injection administered IM to Right ventrogluteal using aseptic technique. Confirmed patient's allergies, Pt tolerated procedure well. Patient agreed to wait 15 minutes after injection in the clinic and to report any adverse reactions.     Nicolas Valles RN

## 2024-02-12 LAB
FINAL PATHOLOGIC DIAGNOSIS: NORMAL
GROSS: NORMAL
Lab: NORMAL

## 2024-02-20 ENCOUNTER — OFFICE VISIT (OUTPATIENT)
Dept: CARDIOLOGY | Facility: CLINIC | Age: 54
End: 2024-02-20
Payer: COMMERCIAL

## 2024-02-20 ENCOUNTER — OFFICE VISIT (OUTPATIENT)
Dept: UROLOGY | Facility: CLINIC | Age: 54
End: 2024-02-20
Payer: COMMERCIAL

## 2024-02-20 VITALS
WEIGHT: 268.06 LBS | BODY MASS INDEX: 36.31 KG/M2 | HEART RATE: 68 BPM | DIASTOLIC BLOOD PRESSURE: 80 MMHG | SYSTOLIC BLOOD PRESSURE: 124 MMHG | HEIGHT: 72 IN | OXYGEN SATURATION: 97 %

## 2024-02-20 VITALS
HEIGHT: 72 IN | SYSTOLIC BLOOD PRESSURE: 132 MMHG | RESPIRATION RATE: 18 BRPM | DIASTOLIC BLOOD PRESSURE: 87 MMHG | HEART RATE: 69 BPM | WEIGHT: 266.56 LBS | BODY MASS INDEX: 36.1 KG/M2

## 2024-02-20 DIAGNOSIS — E29.1 HYPOGONADISM IN MALE: ICD-10-CM

## 2024-02-20 DIAGNOSIS — I10 PRIMARY HYPERTENSION: ICD-10-CM

## 2024-02-20 DIAGNOSIS — E66.01 SEVERE OBESITY (BMI 35.0-39.9) WITH COMORBIDITY: Primary | ICD-10-CM

## 2024-02-20 DIAGNOSIS — N40.2 PROSTATE NODULE: Primary | ICD-10-CM

## 2024-02-20 PROCEDURE — 99999 PR PBB SHADOW E&M-EST. PATIENT-LVL IV: CPT | Mod: PBBFAC,,, | Performed by: STUDENT IN AN ORGANIZED HEALTH CARE EDUCATION/TRAINING PROGRAM

## 2024-02-20 PROCEDURE — 3008F BODY MASS INDEX DOCD: CPT | Mod: CPTII,S$GLB,, | Performed by: STUDENT IN AN ORGANIZED HEALTH CARE EDUCATION/TRAINING PROGRAM

## 2024-02-20 PROCEDURE — 99214 OFFICE O/P EST MOD 30 MIN: CPT | Mod: S$GLB,,, | Performed by: STUDENT IN AN ORGANIZED HEALTH CARE EDUCATION/TRAINING PROGRAM

## 2024-02-20 PROCEDURE — 3008F BODY MASS INDEX DOCD: CPT | Mod: CPTII,S$GLB,, | Performed by: UROLOGY

## 2024-02-20 PROCEDURE — 1159F MED LIST DOCD IN RCRD: CPT | Mod: CPTII,S$GLB,, | Performed by: STUDENT IN AN ORGANIZED HEALTH CARE EDUCATION/TRAINING PROGRAM

## 2024-02-20 PROCEDURE — 3074F SYST BP LT 130 MM HG: CPT | Mod: CPTII,S$GLB,, | Performed by: STUDENT IN AN ORGANIZED HEALTH CARE EDUCATION/TRAINING PROGRAM

## 2024-02-20 PROCEDURE — 99214 OFFICE O/P EST MOD 30 MIN: CPT | Mod: S$GLB,,, | Performed by: UROLOGY

## 2024-02-20 PROCEDURE — 99999 PR PBB SHADOW E&M-EST. PATIENT-LVL IV: CPT | Mod: PBBFAC,,, | Performed by: UROLOGY

## 2024-02-20 PROCEDURE — 3079F DIAST BP 80-89 MM HG: CPT | Mod: CPTII,S$GLB,, | Performed by: UROLOGY

## 2024-02-20 PROCEDURE — 3075F SYST BP GE 130 - 139MM HG: CPT | Mod: CPTII,S$GLB,, | Performed by: UROLOGY

## 2024-02-20 PROCEDURE — 3079F DIAST BP 80-89 MM HG: CPT | Mod: CPTII,S$GLB,, | Performed by: STUDENT IN AN ORGANIZED HEALTH CARE EDUCATION/TRAINING PROGRAM

## 2024-02-20 RX ORDER — NIFEDIPINE 60 MG/1
60 TABLET, EXTENDED RELEASE ORAL 2 TIMES DAILY
Qty: 60 TABLET | Refills: 6 | Status: SHIPPED | OUTPATIENT
Start: 2024-02-20 | End: 2024-03-22 | Stop reason: SDUPTHER

## 2024-02-20 RX ORDER — TESTOSTERONE CYPIONATE 200 MG/ML
120 INJECTION, SOLUTION INTRAMUSCULAR
Qty: 4 ML | Refills: 5 | Status: SHIPPED | OUTPATIENT
Start: 2024-02-20 | End: 2024-08-20

## 2024-02-20 NOTE — PROGRESS NOTES
Section of Cardiology                  Cardiac Clinic Note    Chief Complaint/Reason for consultation: high blood pressure      HPI:   Marcus Peterson is a 53 y.o. male with h/o HTN, obesity who comes in to cardiology for evaluation.    24  Comes in with wife  Was seen in the ED 23 with dizziness, BP noted to be high   CTH negative   Was given meclizine, thought to be vertigo related- resolved   No chest pain, SOB  Exercising more now   Travels a lot for work, eats out a good bit  Eats snacks, not big on sweets   Lost weight to 238 lbs now 257 lbs   No longer on semiglutide (generic)    Says meds not controlling- compliant with taking  Does not take BP regularly at home  Metoprolol xl recently increased    ETOH- wine daily (1 bottle daily)  Denies tobacco abuse     Family hx: HTN- mom and dad  Mom-  of brain aneurysm, mom side of family of heart issues with aunt and uncle        24  BP stable   Watching diet better- portion size and ETOH reduction (about 2 nights a week now)  Weight going up since last visit  Has been stress eating and drinking lately due to having biopsy   Says hard to get HR up with exercise while taking bystolic   Was playing pickle ball yesterday and felt winded and HR would not go above 60s     Denies chest pain, SOB, syncope       EKG 24 NSR, no acute ST - T wave changes    ECHO  No results found for this or any previous visit.       STRESS TEST No results found for this or any previous visit.       C No results found for this or any previous visit.            ROS: All 10 systems reviewed. Please refer to the HPI for pertinent positives. All other systems negative.     Past Medical History  Past Medical History:   Diagnosis Date    Hypertension     Hypogonadism in male        Surgical History  Past Surgical History:   Procedure Laterality Date    COLONOSCOPY N/A 2022    Procedure: COLONOSCOPY;  Surgeon: Marcia Acevedo MD;  Location: [facility]  "END[facility];  Service: Endoscopy;  Laterality: N/A;    EYE SURGERY      KNEE SURGERY            Allergies:   Review of patient's allergies indicates:  No Known Allergies    Social History:  Social History     Socioeconomic History    Marital status:     Number of children: 3   Occupational History    Occupation:    Tobacco Use    Smoking status: Former     Current packs/day: 0.00     Average packs/day: 0.5 packs/day for 5.0 years (2.5 ttl pk-yrs)     Types: Cigarettes     Start date:      Quit date:      Years since quittin.1    Smokeless tobacco: Never   Substance and Sexual Activity    Alcohol use: Yes     Alcohol/week: 10.0 standard drinks of alcohol     Types: 10 Glasses of wine per week    Drug use: Never    Sexual activity: Yes     Partners: Female       Family History:  family history includes Aneurysm in his mother; Arthritis in his mother; Cancer in his paternal grandmother; Depression in his mother; Hypertension in his father and mother; Muscular dystrophy in his paternal grandfather.    Home Medications:  Current Outpatient Medications on File Prior to Visit   Medication Sig Dispense Refill    levoFLOXacin (LEVAQUIN) 500 MG tablet Take 1 po 1 hour before biopsy 1 tablet 0    meclizine (ANTIVERT) 25 mg tablet Take 25 mg by mouth 3 (three) times daily as needed.      moxifloxacin (VIGAMOX) 0.5 % ophthalmic solution       nebivoloL (BYSTOLIC) 10 MG Tab Take 1 tablet (10 mg total) by mouth once daily. 30 tablet 6    needle, disp, 18 G (HYPODERMIC NEEDLES) 18 gauge x 1 1/2" Ndle Use as directed 12 each 4    needle, disp, 18 G 18 gauge x 1" Ndle Use as directed 4 each 12    needle, disp, 25 gauge 25 gauge x 1 1/2" Ndle Use as directed 12 each 4    NIFEdipine (PROCARDIA-XL) 60 MG (OSM) 24 hr tablet Take 1 tablet (60 mg total) by mouth once daily. 30 tablet 6    prednisoLONE acetate (PRED FORTE) 1 % DrpS       safety needles (ECLIPSE NEEDLE) 23 gauge x 1" Ndle Use as directed 4 each 12    " scopolamine (TRANSDERM-SCOP) 1.3-1.5 mg (1 mg over 3 days) Place 1 patch onto the skin every 72 hours. 4 patch 1    syringe, disposable, 3 mL Syrg Use as directed 12 each 4    syringe, disposable, 3 mL Syrg Use as directed 12 each 4    tadalafiL (CIALIS) 20 MG Tab Take 1 po 1.5 hours before intercourse 60 tablet 7    telmisartan-hydrochlorothiazide (MICARDIS HCT) 80-25 mg per tablet Take 1 tablet by mouth once daily. 30 tablet 6    [DISCONTINUED] testosterone cypionate (DEPOTESTOTERONE CYPIONATE) 200 mg/mL injection Inject 0.6 mLs (120 mg total) into the muscle every 7 days. 4 mL 5     Current Facility-Administered Medications on File Prior to Visit   Medication Dose Route Frequency Provider Last Rate Last Admin    lactated ringers infusion   Intravenous Continuous Lamin Mckeon MD 10 mL/hr at 09/16/22 1140 Restarted at 09/16/22 1202       Physical exam:  /80 (BP Location: Left arm, Patient Position: Sitting, BP Method: Large (Manual))   Pulse 68   Ht 6' (1.829 m)   Wt 121.6 kg (268 lb 1.3 oz)   SpO2 97%   BMI 36.36 kg/m²         General: Pt is a 53 y.o. year old male who is AAOx3, in NAD, is pleasant, well nourished, looks stated age  HEENT: PERRL, EOMI, Oral mucosa pink & moist  CVS  No abnormal cardiac pulsations noted on inspection. JVP not raised. The apical impulse is normal on palpation, and is located in the left 5th intercostal space in the mid - clavicular line. No palpable thrills or abnormal pulsations noted. RR, S1 - S2 heard, no murmurs, rubs or gallops appreciated.   PUL : CTA B/L. No wheezes/crackles heard   ABD : BS +, soft. No tenderness elicited   LE : No C/C/E. Distal Pulses palpable B/L         LABS:    Chemistry:   Lab Results   Component Value Date     10/06/2023    K 4.2 10/06/2023     10/06/2023    CO2 27 10/06/2023    BUN 19 10/06/2023    CREATININE 1.2 10/06/2023    CALCIUM 9.3 10/06/2023     Cardiac Markers:   Lab Results   Component Value Date    TROPONINI  "0.02 12/11/2023     Cardiac Markers (Last 3):   Lab Results   Component Value Date    TROPONINI 0.02 12/11/2023     CBC:   Lab Results   Component Value Date    WBC 6.01 10/06/2023    HGB 17.7 10/06/2023    HCT 51.8 10/06/2023    MCV 92 10/06/2023     10/06/2023     Lipids:   Lab Results   Component Value Date    CHOL 161 10/06/2023    TRIG 92 10/06/2023    HDL 38 (L) 10/06/2023     Coagulation: No results found for: "PT", "INR", "APTT"        Assessment    1. Severe obesity (BMI 35.0-39.9) with comorbidity    2. Primary hypertension         Plan:      HTN  Stable   Continue telmisartan/hctz 80/25, nifedipine   Stop bystolic due to hindering HR elevation  Increase nifedipine to 60 BID- if swelling occurs, will switch back to daily and possibly start hydralazine     Obesity, Body mass index is 36.36 kg/m².   Low salt, low fat diet  Exercise as tolerated, at least 30 min daily     This note was prepared using voice recognition system and is likely to have sound alike errors that may have been overlooked even after proofreading.     I have reviewed all pertinent chart information.  Plans and recommendations have been formulated under my direct supervision. All questions answered and patient voiced understanding.   If symptoms persist go to the ED.    RTC in 1 month         Christina Steiner MD  Cardiology          "

## 2024-02-20 NOTE — PROGRESS NOTES
CC: prostate nodule    History of Present Illness:     2/20/24-TRUS biopsy path benign. Had some pretty significant hematospermia, which hasn't yet cleared. No issues otherwise. No fever. Took a testosterone injection yesterday.   2/7/24-TRUS biopsy today. 46cc prostate.   1/16/24-Energy is improved on testosterone. States that new BP meds have significantly affected his erections. He has taken cialis in the past and it has been helpful. Good stream. No hematuria or dysuria.   4/14/23-patient has run out of T and energy and T are low. Would like to restart. Has lost 40 pounds from Intermittent Fasting over the past several months. Libido is low. No bothersome LUTS.   10/14/22-50yo male here for evaluation of low T. Was previously on testosterone when he lived in [place]. Has been off for 8-9 months. Since that time, he reports that he has had a 30 pound fat gain. He has also started HTN meds. Now, he reports low libido. Has difficulty maintaining erections. Low energy. Stream is a little weaker and he has a little post-void dribble.   He was on T cypionate 200mg/mL 0.6mL Q7 days. Was on it for about 4 years.        Review of Systems   Constitutional:  Negative for chills and fever.   Respiratory:  Negative for shortness of breath.    Cardiovascular:  Negative for chest pain.   Gastrointestinal:  Negative for abdominal pain.   All other systems reviewed and are negative.        Past Medical History:   Diagnosis Date    Hypertension     Hypogonadism in male        Past Surgical History:   Procedure Laterality Date    COLONOSCOPY N/A 09/16/2022    Procedure: COLONOSCOPY;  Surgeon: Marcia Acevedo MD;  Location: [facility];  Service: Endoscopy;  Laterality: N/A;    EYE SURGERY      KNEE SURGERY         Family History   Problem Relation Age of Onset    Arthritis Mother     Depression Mother     Hypertension Mother     Aneurysm Mother     Hypertension Father     Cancer Paternal Grandmother     Muscular dystrophy Paternal  "Grandfather        Social History     Tobacco Use    Smoking status: Former     Current packs/day: 0.00     Average packs/day: 0.5 packs/day for 5.0 years (2.5 ttl pk-yrs)     Types: Cigarettes     Start date:      Quit date:      Years since quittin.1    Smokeless tobacco: Never   Substance Use Topics    Alcohol use: Yes     Alcohol/week: 10.0 standard drinks of alcohol     Types: 10 Glasses of wine per week    Drug use: Never       Current Outpatient Medications   Medication Sig Dispense Refill    levoFLOXacin (LEVAQUIN) 500 MG tablet Take 1 po 1 hour before biopsy 1 tablet 0    meclizine (ANTIVERT) 25 mg tablet Take 25 mg by mouth 3 (three) times daily as needed.      moxifloxacin (VIGAMOX) 0.5 % ophthalmic solution       nebivoloL (BYSTOLIC) 10 MG Tab Take 1 tablet (10 mg total) by mouth once daily. 30 tablet 6    needle, disp, 18 G (HYPODERMIC NEEDLES) 18 gauge x 1 1/2" Ndle Use as directed 12 each 4    needle, disp, 18 G 18 gauge x 1" Ndle Use as directed 4 each 12    needle, disp, 25 gauge 25 gauge x 1 1/2" Ndle Use as directed 12 each 4    NIFEdipine (PROCARDIA-XL) 60 MG (OSM) 24 hr tablet Take 1 tablet (60 mg total) by mouth once daily. 30 tablet 6    prednisoLONE acetate (PRED FORTE) 1 % DrpS       safety needles (ECLIPSE NEEDLE) 23 gauge x 1" Ndle Use as directed 4 each 12    scopolamine (TRANSDERM-SCOP) 1.3-1.5 mg (1 mg over 3 days) Place 1 patch onto the skin every 72 hours. 4 patch 1    syringe, disposable, 3 mL Syrg Use as directed 12 each 4    syringe, disposable, 3 mL Syrg Use as directed 12 each 4    tadalafiL (CIALIS) 20 MG Tab Take 1 po 1.5 hours before intercourse 60 tablet 7    telmisartan-hydrochlorothiazide (MICARDIS HCT) 80-25 mg per tablet Take 1 tablet by mouth once daily. 30 tablet 6    testosterone cypionate (DEPOTESTOTERONE CYPIONATE) 200 mg/mL injection Inject 0.6 mLs (120 mg total) into the muscle every 7 days. 4 mL 5     No current facility-administered medications for " this visit.     Facility-Administered Medications Ordered in Other Visits   Medication Dose Route Frequency Provider Last Rate Last Admin    lactated ringers infusion   Intravenous Continuous Lamin Mckeon MD 10 mL/hr at 09/16/22 1140 Restarted at 09/16/22 1202       Review of patient's allergies indicates:  No Known Allergies    Physical Exam  Vitals:    02/20/24 1031   BP: 132/87   Pulse: 69   Resp: 18         General: Well-developed, well-nourished in no acute distress  HEENT: Normocephalic, atraumatic, Extraocular movements intact  Neck: supple, trachea midline, no cervical or supraclavicular lymphadenopathy  Respirations: even and unlabored  Back: midline spine, no CVA tenderness  : 10/14/22-circumcised male phallus without lesions, orthotopic urethral meatus, no inguinal hernia, no inguinal lymphadenopathy, no scrotal lesions, testicles descended bilaterally without mass or tenderness  Rectal: 1/16/24-30g prostate, nodule at the left apex, non-tender  Extremities: atraumatic, moves all equally, no clubbing, cyanosis or edema  Psych: normal affect  Skin: warm and dry, no lesions  Neuro: Alert and oriented, Cranial nerves II-XII grossly intact    Testosterone:   4/10/23: 397   10/6/24: 827    PSA  10/6/23: 2.5    Lab Results   Component Value Date    ALT 52 (H) 10/06/2023    AST 33 10/06/2023    ALKPHOS 46 (L) 10/06/2023    BILITOT 1.2 (H) 10/06/2023     Lab Results   Component Value Date    WBC 6.01 10/06/2023    HGB 17.7 10/06/2023    HCT 51.8 10/06/2023    MCV 92 10/06/2023     10/06/2023         Assessment:   1. Prostate nodule  Prostate Specific Antigen, Diagnostic      2. Hypogonadism in male  Testosterone    Comprehensive Metabolic Panel    CBC Without Differential                Plan:  Prostate nodule  -     Prostate Specific Antigen, Diagnostic; Future; Expected date: 07/11/2024    Hypogonadism in male  -     Testosterone; Future; Expected date: 07/11/2024  -     Comprehensive  Metabolic Panel; Future; Expected date: 07/11/2024  -     CBC Without Differential; Future; Expected date: 07/11/2024    Other orders  -     testosterone cypionate (DEPOTESTOTERONE CYPIONATE) 200 mg/mL injection; Inject 0.6 mLs (120 mg total) into the muscle every 7 days.  Dispense: 4 mL; Refill: 5        Follow up in about 5 months (around 7/20/2024).

## 2024-03-18 ENCOUNTER — OFFICE VISIT (OUTPATIENT)
Dept: INTERNAL MEDICINE | Facility: CLINIC | Age: 54
End: 2024-03-18
Payer: COMMERCIAL

## 2024-03-18 VITALS
HEIGHT: 73 IN | WEIGHT: 276 LBS | DIASTOLIC BLOOD PRESSURE: 86 MMHG | OXYGEN SATURATION: 95 % | BODY MASS INDEX: 36.58 KG/M2 | HEART RATE: 101 BPM | TEMPERATURE: 96 F | SYSTOLIC BLOOD PRESSURE: 138 MMHG

## 2024-03-18 DIAGNOSIS — I10 PRIMARY HYPERTENSION: Primary | ICD-10-CM

## 2024-03-18 DIAGNOSIS — E29.1 HYPOGONADISM IN MALE: ICD-10-CM

## 2024-03-18 PROCEDURE — 3075F SYST BP GE 130 - 139MM HG: CPT | Mod: CPTII,S$GLB,, | Performed by: NURSE PRACTITIONER

## 2024-03-18 PROCEDURE — 3079F DIAST BP 80-89 MM HG: CPT | Mod: CPTII,S$GLB,, | Performed by: NURSE PRACTITIONER

## 2024-03-18 PROCEDURE — 1160F RVW MEDS BY RX/DR IN RCRD: CPT | Mod: CPTII,S$GLB,, | Performed by: NURSE PRACTITIONER

## 2024-03-18 PROCEDURE — 3008F BODY MASS INDEX DOCD: CPT | Mod: CPTII,S$GLB,, | Performed by: NURSE PRACTITIONER

## 2024-03-18 PROCEDURE — 1159F MED LIST DOCD IN RCRD: CPT | Mod: CPTII,S$GLB,, | Performed by: NURSE PRACTITIONER

## 2024-03-18 PROCEDURE — 99214 OFFICE O/P EST MOD 30 MIN: CPT | Mod: S$GLB,,, | Performed by: NURSE PRACTITIONER

## 2024-03-18 PROCEDURE — 99999 PR PBB SHADOW E&M-EST. PATIENT-LVL V: CPT | Mod: PBBFAC,,, | Performed by: NURSE PRACTITIONER

## 2024-03-18 NOTE — PROGRESS NOTES
"Subjective:       Patient ID: Marcus Peterson is a 53 y.o. male.    Chief Complaint: Follow-up    Pt presents to clinic today for follow up  Overall has been doing well  BP a little elevated when he arrived  Improved upon recheck  Reports compliance with his meds  Following with DR. Steiner     He is on testosterone replacement through Dr. Topete  Doing well with that        /86   Pulse 101   Temp 96.4 °F (35.8 °C) (Tympanic)   Ht 6' 1" (1.854 m)   Wt 125.2 kg (276 lb 0.3 oz)   SpO2 95%   BMI 36.42 kg/m²     Review of Systems   Constitutional:  Negative for activity change, appetite change, chills, diaphoresis, fatigue, fever and unexpected weight change.   HENT: Negative.     Eyes: Negative.    Respiratory:  Negative for apnea, chest tightness, shortness of breath and stridor.    Cardiovascular:  Negative for chest pain, palpitations and leg swelling.   Gastrointestinal: Negative.    Endocrine: Negative.    Genitourinary: Negative.    Musculoskeletal:  Negative for arthralgias and myalgias.   Skin:  Negative for color change, pallor, rash and wound.   Allergic/Immunologic: Negative.    Neurological:  Negative for dizziness, facial asymmetry, light-headedness and headaches.   Hematological:  Negative for adenopathy.   Psychiatric/Behavioral:  Negative for agitation and behavioral problems.        Objective:      Physical Exam  Vitals and nursing note reviewed.   Constitutional:       General: He is not in acute distress.     Appearance: He is well-developed. He is obese. He is not diaphoretic.   HENT:      Head: Normocephalic and atraumatic.   Cardiovascular:      Rate and Rhythm: Normal rate and regular rhythm.      Heart sounds: Normal heart sounds.   Pulmonary:      Effort: Pulmonary effort is normal. No respiratory distress.      Breath sounds: Normal breath sounds.   Skin:     General: Skin is warm and dry.      Findings: No rash.   Neurological:      Mental Status: He is alert and oriented to " person, place, and time.   Psychiatric:         Behavior: Behavior normal.         Thought Content: Thought content normal.         Judgment: Judgment normal.         Assessment:       1. Primary hypertension    2. Hypogonadism in male    3. BMI 36.0-36.9,adult        Plan:       Marcus was seen today for follow-up.    Diagnoses and all orders for this visit:    Primary hypertension    Hypogonadism in male    BMI 36.0-36.9,adult      Overall doing well  Continue to work on weight loss  Continue meds as prescribed  Keep follow up with DR. Steiner as scheduled  Dr. Wilhelm in 6 months and PRN

## 2024-03-22 ENCOUNTER — OFFICE VISIT (OUTPATIENT)
Dept: CARDIOLOGY | Facility: CLINIC | Age: 54
End: 2024-03-22
Payer: COMMERCIAL

## 2024-03-22 VITALS
SYSTOLIC BLOOD PRESSURE: 132 MMHG | WEIGHT: 277.75 LBS | DIASTOLIC BLOOD PRESSURE: 86 MMHG | OXYGEN SATURATION: 95 % | HEIGHT: 73 IN | BODY MASS INDEX: 36.81 KG/M2 | HEART RATE: 92 BPM

## 2024-03-22 DIAGNOSIS — I10 PRIMARY HYPERTENSION: Primary | ICD-10-CM

## 2024-03-22 DIAGNOSIS — E66.01 SEVERE OBESITY (BMI 35.0-39.9) WITH COMORBIDITY: ICD-10-CM

## 2024-03-22 PROCEDURE — 3079F DIAST BP 80-89 MM HG: CPT | Mod: CPTII,S$GLB,, | Performed by: STUDENT IN AN ORGANIZED HEALTH CARE EDUCATION/TRAINING PROGRAM

## 2024-03-22 PROCEDURE — 99999 PR PBB SHADOW E&M-EST. PATIENT-LVL IV: CPT | Mod: PBBFAC,,, | Performed by: STUDENT IN AN ORGANIZED HEALTH CARE EDUCATION/TRAINING PROGRAM

## 2024-03-22 PROCEDURE — 3008F BODY MASS INDEX DOCD: CPT | Mod: CPTII,S$GLB,, | Performed by: STUDENT IN AN ORGANIZED HEALTH CARE EDUCATION/TRAINING PROGRAM

## 2024-03-22 PROCEDURE — 1159F MED LIST DOCD IN RCRD: CPT | Mod: CPTII,S$GLB,, | Performed by: STUDENT IN AN ORGANIZED HEALTH CARE EDUCATION/TRAINING PROGRAM

## 2024-03-22 PROCEDURE — 99214 OFFICE O/P EST MOD 30 MIN: CPT | Mod: S$GLB,,, | Performed by: STUDENT IN AN ORGANIZED HEALTH CARE EDUCATION/TRAINING PROGRAM

## 2024-03-22 PROCEDURE — 3075F SYST BP GE 130 - 139MM HG: CPT | Mod: CPTII,S$GLB,, | Performed by: STUDENT IN AN ORGANIZED HEALTH CARE EDUCATION/TRAINING PROGRAM

## 2024-03-22 RX ORDER — NIFEDIPINE 60 MG/1
60 TABLET, EXTENDED RELEASE ORAL DAILY
Qty: 30 TABLET | Refills: 6
Start: 2024-03-22 | End: 2025-03-22

## 2024-03-22 RX ORDER — DOXAZOSIN 2 MG/1
2 TABLET ORAL NIGHTLY
Qty: 30 TABLET | Refills: 6 | Status: SHIPPED | OUTPATIENT
Start: 2024-03-22 | End: 2025-03-22

## 2024-03-22 NOTE — PROGRESS NOTES
Section of Cardiology                  Cardiac Clinic Note    Chief Complaint/Reason for consultation: high blood pressure      HPI:   Marcus Peterson is a 53 y.o. male with h/o HTN, obesity who comes in to cardiology for evaluation.    24  Comes in with wife  Was seen in the ED 23 with dizziness, BP noted to be high   CTH negative   Was given meclizine, thought to be vertigo related- resolved   No chest pain, SOB  Exercising more now   Travels a lot for work, eats out a good bit  Eats snacks, not big on sweets   Lost weight to 238 lbs now 257 lbs   No longer on semiglutide (generic)    Says meds not controlling- compliant with taking  Does not take BP regularly at home  Metoprolol xl recently increased    ETOH- wine daily (1 bottle daily)  Denies tobacco abuse     Family hx: HTN- mom and dad  Mom-  of brain aneurysm, mom side of family of heart issues with aunt and uncle        24  BP stable   Watching diet better- portion size and ETOH reduction (about 2 nights a week now)  Weight going up since last visit  Has been stress eating and drinking lately due to having biopsy   Says hard to get HR up with exercise while taking bystolic   Was playing pickle ball yesterday and felt winded and HR would not go above 60s     Denies chest pain, SOB, syncope       3/22/24  Weight is going up  Has been watching diet and exercising  Has swelling in legs since increase in nifedipine  Wants to get back on bystolic  Watching salt intake  Sailing in 1-2 weeks    Denies chest pain, SOB, syncope, PND    EKG 24 NSR, no acute ST - T wave changes    ECHO  No results found for this or any previous visit.       STRESS TEST No results found for this or any previous visit.       [facility] No results found for this or any previous visit.            ROS: All 10 systems reviewed. Please refer to the HPI for pertinent positives. All other systems negative.     Past Medical History  Past Medical History:  "  Diagnosis Date    Hypertension     Hypogonadism in male        Surgical History  Past Surgical History:   Procedure Laterality Date    COLONOSCOPY N/A 2022    Procedure: COLONOSCOPY;  Surgeon: Marcia Acevedo MD;  Location: [facility];  Service: Endoscopy;  Laterality: N/A;    EYE SURGERY      KNEE SURGERY            Allergies:   Review of patient's allergies indicates:  No Known Allergies    Social History:  Social History     Socioeconomic History    Marital status:     Number of children: 3   Occupational History    Occupation:    Tobacco Use    Smoking status: Former     Current packs/day: 0.00     Average packs/day: 0.5 packs/day for 5.0 years (2.5 ttl pk-yrs)     Types: Cigarettes     Start date:      Quit date:      Years since quittin.2    Smokeless tobacco: Never   Substance and Sexual Activity    Alcohol use: Yes     Alcohol/week: 10.0 standard drinks of alcohol     Types: 10 Glasses of wine per week    Drug use: Never    Sexual activity: Yes     Partners: Female       Family History:  family history includes Aneurysm in his mother; Arthritis in his mother; Cancer in his paternal grandmother; Depression in his mother; Hypertension in his father and mother; Muscular dystrophy in his paternal grandfather.    Home Medications:  Current Outpatient Medications on File Prior to Visit   Medication Sig Dispense Refill    needle, disp, 18 G (HYPODERMIC NEEDLES) 18 gauge x 1 1/2" Ndle Use as directed 12 each 4    needle, disp, 18 G 18 gauge x 1" Ndle Use as directed 4 each 12    needle, disp, 25 gauge 25 gauge x 1 1/2" Ndle Use as directed 12 each 4    safety needles (ECLIPSE NEEDLE) 23 gauge x 1" Ndle Use as directed 4 each 12    scopolamine (TRANSDERM-SCOP) 1.3-1.5 mg (1 mg over 3 days) Place 1 patch onto the skin every 72 hours. 4 patch 1    syringe, disposable, 3 mL Syrg Use as directed 12 each 4    syringe, disposable, 3 mL Syrg Use as directed 12 each 4    tadalafiL " "(CIALIS) 20 MG Tab Take 1 po 1.5 hours before intercourse 60 tablet 7    telmisartan-hydrochlorothiazide (MICARDIS HCT) 80-25 mg per tablet Take 1 tablet by mouth once daily. 30 tablet 6    testosterone cypionate (DEPOTESTOTERONE CYPIONATE) 200 mg/mL injection Inject 0.6 mLs (120 mg total) into the muscle every 7 days. 4 mL 5    [DISCONTINUED] NIFEdipine (PROCARDIA-XL) 60 MG (OSM) 24 hr tablet Take 1 tablet (60 mg total) by mouth 2 (two) times a day. 60 tablet 6    meclizine (ANTIVERT) 25 mg tablet Take 25 mg by mouth 3 (three) times daily as needed.      moxifloxacin (VIGAMOX) 0.5 % ophthalmic solution       prednisoLONE acetate (PRED FORTE) 1 % DrpS       [DISCONTINUED] levoFLOXacin (LEVAQUIN) 500 MG tablet Take 1 po 1 hour before biopsy (Patient not taking: Reported on 3/22/2024) 1 tablet 0     Current Facility-Administered Medications on File Prior to Visit   Medication Dose Route Frequency Provider Last Rate Last Admin    lactated ringers infusion   Intravenous Continuous Lamin Mckeon MD 10 mL/hr at 09/16/22 1140 Restarted at 09/16/22 1202       Physical exam:  /86 (BP Location: Right arm, Patient Position: Sitting, BP Method: Large (Manual))   Pulse 92   Ht 6' 1" (1.854 m)   Wt 126 kg (277 lb 12.5 oz)   SpO2 95%   BMI 36.65 kg/m²         General: Pt is a 53 y.o. year old male who is AAOx3, in NAD, is pleasant, well nourished, looks stated age  HEENT: PERRL, EOMI, Oral mucosa pink & moist  CVS  No abnormal cardiac pulsations noted on inspection. JVP not raised. The apical impulse is normal on palpation, and is located in the left 5th intercostal space in the mid - clavicular line. No palpable thrills or abnormal pulsations noted. RR, S1 - S2 heard, no murmurs, rubs or gallops appreciated.   PUL : CTA B/L. No wheezes/crackles heard   ABD : BS +, soft. No tenderness elicited   LE : bilateral swelling, Distal Pulses palpable B/L         LABS:    Chemistry:   Lab Results   Component Value Date " "    10/06/2023    K 4.2 10/06/2023     10/06/2023    CO2 27 10/06/2023    BUN 19 10/06/2023    CREATININE 1.2 10/06/2023    CALCIUM 9.3 10/06/2023     Cardiac Markers:   Lab Results   Component Value Date    TROPONINI 0.02 12/11/2023     Cardiac Markers (Last 3):   Lab Results   Component Value Date    TROPONINI 0.02 12/11/2023     CBC:   Lab Results   Component Value Date    WBC 6.01 10/06/2023    HGB 17.7 10/06/2023    HCT 51.8 10/06/2023    MCV 92 10/06/2023     10/06/2023     Lipids:   Lab Results   Component Value Date    CHOL 161 10/06/2023    TRIG 92 10/06/2023    HDL 38 (L) 10/06/2023     Coagulation: No results found for: "PT", "INR", "APTT"        Assessment    1. Primary hypertension    2. Severe obesity (BMI 35.0-39.9) with comorbidity           Plan:      HTN  Stable   Continue telmisartan/hctz 80/25, nifedipine   Stopped bystolic due to hindering HR elevation  decrease nifedipine 60 daily 2/2 swelling  Start cardura 2 mg at night    Obesity, Body mass index is 36.65 kg/m².   Low salt, low fat diet  Exercise as tolerated, at least 30 min daily     This note was prepared using voice recognition system and is likely to have sound alike errors that may have been overlooked even after proofreading.     I have reviewed all pertinent chart information.  Plans and recommendations have been formulated under my direct supervision. All questions answered and patient voiced understanding.   If symptoms persist go to the ED.    RTC in 6w        Christina Steiner MD  Cardiology          "

## 2024-05-21 ENCOUNTER — OFFICE VISIT (OUTPATIENT)
Dept: CARDIOLOGY | Facility: CLINIC | Age: 54
End: 2024-05-21
Payer: COMMERCIAL

## 2024-05-21 VITALS
DIASTOLIC BLOOD PRESSURE: 80 MMHG | OXYGEN SATURATION: 96 % | BODY MASS INDEX: 35.64 KG/M2 | SYSTOLIC BLOOD PRESSURE: 134 MMHG | WEIGHT: 268.94 LBS | HEART RATE: 92 BPM | HEIGHT: 73 IN

## 2024-05-21 DIAGNOSIS — I10 PRIMARY HYPERTENSION: Primary | ICD-10-CM

## 2024-05-21 DIAGNOSIS — E66.01 SEVERE OBESITY (BMI 35.0-39.9) WITH COMORBIDITY: ICD-10-CM

## 2024-05-21 PROCEDURE — 1159F MED LIST DOCD IN RCRD: CPT | Mod: CPTII,S$GLB,, | Performed by: STUDENT IN AN ORGANIZED HEALTH CARE EDUCATION/TRAINING PROGRAM

## 2024-05-21 PROCEDURE — 99214 OFFICE O/P EST MOD 30 MIN: CPT | Mod: S$GLB,,, | Performed by: STUDENT IN AN ORGANIZED HEALTH CARE EDUCATION/TRAINING PROGRAM

## 2024-05-21 PROCEDURE — 3075F SYST BP GE 130 - 139MM HG: CPT | Mod: CPTII,S$GLB,, | Performed by: STUDENT IN AN ORGANIZED HEALTH CARE EDUCATION/TRAINING PROGRAM

## 2024-05-21 PROCEDURE — 3008F BODY MASS INDEX DOCD: CPT | Mod: CPTII,S$GLB,, | Performed by: STUDENT IN AN ORGANIZED HEALTH CARE EDUCATION/TRAINING PROGRAM

## 2024-05-21 PROCEDURE — 3079F DIAST BP 80-89 MM HG: CPT | Mod: CPTII,S$GLB,, | Performed by: STUDENT IN AN ORGANIZED HEALTH CARE EDUCATION/TRAINING PROGRAM

## 2024-05-21 PROCEDURE — 99999 PR PBB SHADOW E&M-EST. PATIENT-LVL IV: CPT | Mod: PBBFAC,,, | Performed by: STUDENT IN AN ORGANIZED HEALTH CARE EDUCATION/TRAINING PROGRAM

## 2024-05-21 NOTE — PROGRESS NOTES
Section of Cardiology                  Cardiac Clinic Note    Chief Complaint/Reason for consultation: high blood pressure      HPI:   Marcus Peterson is a 53 y.o. male with h/o HTN, obesity who comes in to cardiology for evaluation.    24  Comes in with wife  Was seen in the ED 23 with dizziness, BP noted to be high   CTH negative   Was given meclizine, thought to be vertigo related- resolved   No chest pain, SOB  Exercising more now   Travels a lot for work, eats out a good bit  Eats snacks, not big on sweets   Lost weight to 238 lbs now 257 lbs   No longer on semiglutide (generic)    Says meds not controlling- compliant with taking  Does not take BP regularly at home  Metoprolol xl recently increased    ETOH- wine daily (1 bottle daily)  Denies tobacco abuse     Family hx: HTN- mom and dad  Mom-  of brain aneurysm, mom side of family of heart issues with aunt and uncle        24  BP stable   Watching diet better- portion size and ETOH reduction (about 2 nights a week now)  Weight going up since last visit  Has been stress eating and drinking lately due to having biopsy   Says hard to get HR up with exercise while taking bystolic   Was playing [place] ball yesterday and felt winded and HR would not go above 60s     Denies chest pain, SOB, syncope       3/22/24  Weight is going up  Has been watching diet and exercising  Has swelling in legs since increase in nifedipine  Wants to get back on bystolic  Watching salt intake  Sailing in 1-2 weeks    Denies chest pain, SOB, syncope, PND      24  Doing well  Recently went to [employer]  Losing weight 277->268 lbs  Watching diet  BP stable      Denies chest pain, SOB, syncope, PND        EKG 24 NSR, no acute ST - T wave changes    ECHO  No results found for this or any previous visit.       STRESS TEST No results found for this or any previous visit.       [facility] No results found for this or any previous  "visit.            ROS: All 10 systems reviewed. Please refer to the HPI for pertinent positives. All other systems negative.     Past Medical History  Past Medical History:   Diagnosis Date    Hypertension     Hypogonadism in male        Surgical History  Past Surgical History:   Procedure Laterality Date    COLONOSCOPY N/A 2022    Procedure: COLONOSCOPY;  Surgeon: Marcia Acevedo MD;  Location: [facility];  Service: Endoscopy;  Laterality: N/A;    EYE SURGERY      KNEE SURGERY            Allergies:   Review of patient's allergies indicates:  No Known Allergies    Social History:  Social History     Socioeconomic History    Marital status:     Number of children: 3   Occupational History    Occupation:    Tobacco Use    Smoking status: Former     Current packs/day: 0.00     Average packs/day: 0.5 packs/day for 5.0 years (2.5 ttl pk-yrs)     Types: Cigarettes     Start date:      Quit date:      Years since quittin.4    Smokeless tobacco: Never   Substance and Sexual Activity    Alcohol use: Yes     Alcohol/week: 10.0 standard drinks of alcohol     Types: 10 Glasses of wine per week    Drug use: Never    Sexual activity: Yes     Partners: Female       Family History:  family history includes Aneurysm in his mother; Arthritis in his mother; Cancer in his paternal grandmother; Depression in his mother; Hypertension in his father and mother; Muscular dystrophy in his paternal grandfather.    Home Medications:  Current Outpatient Medications on File Prior to Visit   Medication Sig Dispense Refill    doxazosin (CARDURA) 2 MG tablet Take 1 tablet (2 mg total) by mouth every evening. 30 tablet 6    meclizine (ANTIVERT) 25 mg tablet Take 25 mg by mouth 3 (three) times daily as needed.      moxifloxacin (VIGAMOX) 0.5 % ophthalmic solution       needle, disp, 18 G (HYPODERMIC NEEDLES) 18 gauge x 1 1/2" Ndle Use as directed 12 each 4    needle, disp, 18 G 18 gauge x 1" Ndle Use as directed " "4 each 12    needle, disp, 25 gauge 25 gauge x 1 1/2" Ndle Use as directed 12 each 4    NIFEdipine (PROCARDIA-XL) 60 MG (OSM) 24 hr tablet Take 1 tablet (60 mg total) by mouth once daily. 30 tablet 6    prednisoLONE acetate (PRED FORTE) 1 % DrpS       safety needles (ECLIPSE NEEDLE) 23 gauge x 1" Ndle Use as directed 4 each 12    scopolamine (TRANSDERM-SCOP) 1.3-1.5 mg (1 mg over 3 days) Place 1 patch onto the skin every 72 hours. 4 patch 1    syringe, disposable, 3 mL Syrg Use as directed 12 each 4    syringe, disposable, 3 mL Syrg Use as directed 12 each 4    tadalafiL (CIALIS) 20 MG Tab Take 1 po 1.5 hours before intercourse 60 tablet 7    telmisartan-hydrochlorothiazide (MICARDIS HCT) 80-25 mg per tablet Take 1 tablet by mouth once daily. 30 tablet 6    testosterone cypionate (DEPOTESTOTERONE CYPIONATE) 200 mg/mL injection Inject 0.6 mLs (120 mg total) into the muscle every 7 days. 4 mL 5     Current Facility-Administered Medications on File Prior to Visit   Medication Dose Route Frequency Provider Last Rate Last Admin    lactated ringers infusion   Intravenous Continuous Lamin Mckeon MD 10 mL/hr at 09/16/22 1140 Restarted at 09/16/22 1202       Physical exam:  /80 (BP Location: Left arm, Patient Position: Sitting, BP Method: Large (Manual))   Pulse 92   Ht 6' 1" (1.854 m)   Wt 122 kg (268 lb 15.4 oz)   SpO2 96%   BMI 35.49 kg/m²         General: Pt is a 53 y.o. year old male who is AAOx3, in NAD, is pleasant, well nourished, looks stated age  HEENT: PERRL, EOMI, Oral mucosa pink & moist  CVS  No abnormal cardiac pulsations noted on inspection. JVP not raised. The apical impulse is normal on palpation, and is located in the left 5th intercostal space in the mid - clavicular line. No palpable thrills or abnormal pulsations noted. RR, S1 - S2 heard, no murmurs, rubs or gallops appreciated.   PUL : CTA B/L. No wheezes/crackles heard   ABD : BS +, soft. No tenderness elicited   LE : no leg " "swelling, Distal Pulses palpable B/L         LABS:    Chemistry:   Lab Results   Component Value Date     10/06/2023    K 4.2 10/06/2023     10/06/2023    CO2 27 10/06/2023    BUN 19 10/06/2023    CREATININE 1.2 10/06/2023    CALCIUM 9.3 10/06/2023     Cardiac Markers:   Lab Results   Component Value Date    TROPONINI 0.02 12/11/2023     Cardiac Markers (Last 3):   Lab Results   Component Value Date    TROPONINI 0.02 12/11/2023     CBC:   Lab Results   Component Value Date    WBC 6.01 10/06/2023    HGB 17.7 10/06/2023    HCT 51.8 10/06/2023    MCV 92 10/06/2023     10/06/2023     Lipids:   Lab Results   Component Value Date    CHOL 161 10/06/2023    TRIG 92 10/06/2023    HDL 38 (L) 10/06/2023     Coagulation: No results found for: "PT", "INR", "APTT"        Assessment    1. Primary hypertension    2. Severe obesity (BMI 35.0-39.9) with comorbidity             Plan:      HTN  Stable   Continue telmisartan/hctz 80/25, nifedipine   Stopped bystolic due to hindering HR elevation  Continue nifedipine 60 daily (had swelling with BID),  cardura 2 mg at night    Obesity, Body mass index is 35.49 kg/m².   Low salt, low fat diet  Exercise as tolerated, at least 30 min daily     This note was prepared using voice recognition system and is likely to have sound alike errors that may have been overlooked even after proofreading.     I have reviewed all pertinent chart information.  Plans and recommendations have been formulated under my direct supervision. All questions answered and patient voiced understanding.   If symptoms persist go to the ED.    RTC in 6 m (if stable next visit, will have follow up prn)        Christina Steiner MD  Cardiology            "

## 2024-07-16 ENCOUNTER — LAB VISIT (OUTPATIENT)
Dept: LAB | Facility: HOSPITAL | Age: 54
End: 2024-07-16
Attending: UROLOGY
Payer: COMMERCIAL

## 2024-07-16 DIAGNOSIS — N40.2 PROSTATE NODULE: ICD-10-CM

## 2024-07-16 DIAGNOSIS — E29.1 HYPOGONADISM IN MALE: ICD-10-CM

## 2024-07-16 LAB
ALBUMIN SERPL BCP-MCNC: 4 G/DL (ref 3.5–5.2)
ALP SERPL-CCNC: 58 U/L (ref 55–135)
ALT SERPL W/O P-5'-P-CCNC: 85 U/L (ref 10–44)
ANION GAP SERPL CALC-SCNC: 12 MMOL/L (ref 8–16)
AST SERPL-CCNC: 41 U/L (ref 10–40)
BILIRUB SERPL-MCNC: 1.2 MG/DL (ref 0.1–1)
BUN SERPL-MCNC: 16 MG/DL (ref 6–20)
CALCIUM SERPL-MCNC: 8.7 MG/DL (ref 8.7–10.5)
CHLORIDE SERPL-SCNC: 104 MMOL/L (ref 95–110)
CO2 SERPL-SCNC: 25 MMOL/L (ref 23–29)
COMPLEXED PSA SERPL-MCNC: 3 NG/ML (ref 0–4)
CREAT SERPL-MCNC: 0.9 MG/DL (ref 0.5–1.4)
ERYTHROCYTE [DISTWIDTH] IN BLOOD BY AUTOMATED COUNT: 13 % (ref 11.5–14.5)
EST. GFR  (NO RACE VARIABLE): >60 ML/MIN/1.73 M^2
GLUCOSE SERPL-MCNC: 103 MG/DL (ref 70–110)
HCT VFR BLD AUTO: 54.6 % (ref 40–54)
HGB BLD-MCNC: 18.4 G/DL (ref 14–18)
MCH RBC QN AUTO: 30.1 PG (ref 27–31)
MCHC RBC AUTO-ENTMCNC: 33.7 G/DL (ref 32–36)
MCV RBC AUTO: 89 FL (ref 82–98)
PLATELET # BLD AUTO: 174 K/UL (ref 150–450)
PMV BLD AUTO: 11.7 FL (ref 9.2–12.9)
POTASSIUM SERPL-SCNC: 3.4 MMOL/L (ref 3.5–5.1)
PROT SERPL-MCNC: 7.1 G/DL (ref 6–8.4)
RBC # BLD AUTO: 6.11 M/UL (ref 4.6–6.2)
SODIUM SERPL-SCNC: 141 MMOL/L (ref 136–145)
TESTOST SERPL-MCNC: 464 NG/DL (ref 304–1227)
WBC # BLD AUTO: 4.57 K/UL (ref 3.9–12.7)

## 2024-07-16 PROCEDURE — 80053 COMPREHEN METABOLIC PANEL: CPT | Performed by: UROLOGY

## 2024-07-16 PROCEDURE — 85027 COMPLETE CBC AUTOMATED: CPT | Performed by: UROLOGY

## 2024-07-16 PROCEDURE — 36415 COLL VENOUS BLD VENIPUNCTURE: CPT | Mod: PN | Performed by: UROLOGY

## 2024-07-16 PROCEDURE — 84153 ASSAY OF PSA TOTAL: CPT | Performed by: UROLOGY

## 2024-07-16 PROCEDURE — 84403 ASSAY OF TOTAL TESTOSTERONE: CPT | Performed by: UROLOGY

## 2024-07-23 ENCOUNTER — OFFICE VISIT (OUTPATIENT)
Dept: UROLOGY | Facility: CLINIC | Age: 54
End: 2024-07-23
Payer: COMMERCIAL

## 2024-07-23 VITALS
SYSTOLIC BLOOD PRESSURE: 133 MMHG | HEART RATE: 80 BPM | WEIGHT: 263.44 LBS | RESPIRATION RATE: 18 BRPM | HEIGHT: 73 IN | TEMPERATURE: 98 F | DIASTOLIC BLOOD PRESSURE: 83 MMHG | BODY MASS INDEX: 34.91 KG/M2

## 2024-07-23 DIAGNOSIS — E29.1 HYPOGONADISM IN MALE: Primary | ICD-10-CM

## 2024-07-23 DIAGNOSIS — N40.2 PROSTATE NODULE: ICD-10-CM

## 2024-07-23 DIAGNOSIS — D75.1 POLYCYTHEMIA, SECONDARY: ICD-10-CM

## 2024-07-23 DIAGNOSIS — N52.03 COMBINED ARTERIAL INSUFFICIENCY AND CORPORO-VENOUS OCCLUSIVE ERECTILE DYSFUNCTION: ICD-10-CM

## 2024-07-23 PROCEDURE — 99214 OFFICE O/P EST MOD 30 MIN: CPT | Mod: S$GLB,,, | Performed by: UROLOGY

## 2024-07-23 PROCEDURE — 99999 PR PBB SHADOW E&M-EST. PATIENT-LVL IV: CPT | Mod: PBBFAC,,, | Performed by: UROLOGY

## 2024-07-23 RX ORDER — TESTOSTERONE CYPIONATE 200 MG/ML
160 INJECTION, SOLUTION INTRAMUSCULAR
Qty: 4 ML | Refills: 5 | Status: SHIPPED | OUTPATIENT
Start: 2024-07-23 | End: 2025-01-21

## 2024-07-23 NOTE — PROGRESS NOTES
CC: low T    History of Present Illness:     7/23/24-Energy is fair. Libido is fair. No weak stream. No gross hematuria. Cialis 20mg works well.   2/20/24-TRUS biopsy path benign. Had some pretty significant hematospermia, which hasn't yet cleared. No issues otherwise. No fever. Took a testosterone injection yesterday.   2/7/24-TRUS biopsy today. 46cc prostate.   1/16/24-Energy is improved on testosterone. States that new BP meds have significantly affected his erections. He has taken cialis in the past and it has been helpful. Good stream. No hematuria or dysuria.   4/14/23-patient has run out of T and energy and T are low. Would like to restart. Has lost 40 pounds from Intermittent Fasting over the past several months. Libido is low. No bothersome LUTS.   10/14/22-52yo male here for evaluation of low T. Was previously on testosterone when he lived in [place]. Has been off for 8-9 months. Since that time, he reports that he has had a 30 pound fat gain. He has also started HTN meds. Now, he reports low libido. Has difficulty maintaining erections. Low energy. Stream is a little weaker and he has a little post-void dribble.   He was on T cypionate 200mg/mL 0.6mL Q7 days. Was on it for about 4 years.        Review of Systems   Constitutional:  Negative for chills and fever.   Respiratory:  Negative for shortness of breath.    Cardiovascular:  Negative for chest pain.   Gastrointestinal:  Negative for abdominal pain.   All other systems reviewed and are negative.        Past Medical History:   Diagnosis Date    Hypertension     Hypogonadism in male        Past Surgical History:   Procedure Laterality Date    COLONOSCOPY N/A 09/16/2022    Procedure: COLONOSCOPY;  Surgeon: Marcia Acevedo MD;  Location: [facility];  Service: Endoscopy;  Laterality: N/A;    EYE SURGERY      KNEE SURGERY         Family History   Problem Relation Name Age of Onset    Arthritis Mother      Depression Mother      Hypertension Mother       "Aneurysm Mother      Hypertension Father      Cancer Paternal Grandmother      Muscular dystrophy Paternal Grandfather         Social History     Tobacco Use    Smoking status: Former     Current packs/day: 0.00     Average packs/day: 0.5 packs/day for 5.0 years (2.5 ttl pk-yrs)     Types: Cigarettes     Start date:      Quit date:      Years since quittin.5    Smokeless tobacco: Never   Substance Use Topics    Alcohol use: Yes     Alcohol/week: 10.0 standard drinks of alcohol     Types: 10 Glasses of wine per week    Drug use: Never       Current Outpatient Medications   Medication Sig Dispense Refill    doxazosin (CARDURA) 2 MG tablet Take 1 tablet (2 mg total) by mouth every evening. 30 tablet 6    meclizine (ANTIVERT) 25 mg tablet Take 25 mg by mouth 3 (three) times daily as needed.      moxifloxacin (VIGAMOX) 0.5 % ophthalmic solution       needle, disp, 18 G (HYPODERMIC NEEDLES) 18 gauge x 1 1/2" Ndle Use as directed 12 each 4    needle, disp, 18 G 18 gauge x 1" Ndle Use as directed 4 each 12    needle, disp, 25 gauge 25 gauge x 1 1/2" Ndle Use as directed 12 each 4    NIFEdipine (PROCARDIA-XL) 60 MG (OSM) 24 hr tablet Take 1 tablet (60 mg total) by mouth once daily. 30 tablet 6    prednisoLONE acetate (PRED FORTE) 1 % DrpS       safety needles (ECLIPSE NEEDLE) 23 gauge x 1" Ndle Use as directed 4 each 12    scopolamine (TRANSDERM-SCOP) 1.3-1.5 mg (1 mg over 3 days) Place 1 patch onto the skin every 72 hours. 4 patch 1    syringe, disposable, 3 mL Syrg Use as directed 12 each 4    syringe, disposable, 3 mL Syrg Use as directed 12 each 4    tadalafiL (CIALIS) 20 MG Tab Take 1 po 1.5 hours before intercourse 60 tablet 7    telmisartan-hydrochlorothiazide (MICARDIS HCT) 80-25 mg per tablet TAKE 1 TABLET BY MOUTH ONCE DAILY. 30 tablet 6    testosterone cypionate (DEPOTESTOTERONE CYPIONATE) 200 mg/mL injection Inject 0.8 mLs (160 mg total) into the muscle every 7 days. 4 mL 5     No current " facility-administered medications for this visit.     Facility-Administered Medications Ordered in Other Visits   Medication Dose Route Frequency Provider Last Rate Last Admin    lactated ringers infusion   Intravenous Continuous Lamin Mckeon MD 10 mL/hr at 09/16/22 1140 Restarted at 09/16/22 1202       Review of patient's allergies indicates:  No Known Allergies    Physical Exam  Vitals:    07/23/24 1314   BP: 133/83   Pulse: 80   Resp: 18   Temp: 98.1 °F (36.7 °C)         General: Well-developed, well-nourished in no acute distress  HEENT: Normocephalic, atraumatic, Extraocular movements intact  Neck: supple, trachea midline, no cervical or supraclavicular lymphadenopathy  Respirations: even and unlabored  Back: midline spine, no CVA tenderness  : 10/14/22-circumcised male phallus without lesions, orthotopic urethral meatus, no inguinal hernia, no inguinal lymphadenopathy, no scrotal lesions, testicles descended bilaterally without mass or tenderness  Rectal: 1/16/24-30g prostate, nodule at the left apex, non-tender  Extremities: atraumatic, moves all equally, no clubbing, cyanosis or edema  Psych: normal affect  Skin: warm and dry, no lesions  Neuro: Alert and oriented, Cranial nerves II-XII grossly intact    Testosterone:   4/10/23: 397   10/6/23: 827  7/16/24: 464    PSA  10/6/23: 2.5  7/16/24: 3.0    Lab Results   Component Value Date    ALT 85 (H) 07/16/2024    AST 41 (H) 07/16/2024    ALKPHOS 58 07/16/2024    BILITOT 1.2 (H) 07/16/2024     Lab Results   Component Value Date    WBC 4.57 07/16/2024    HGB 18.4 (H) 07/16/2024    HCT 54.6 (H) 07/16/2024    MCV 89 07/16/2024     07/16/2024         Assessment:   1. Hypogonadism in male  CBC Without Differential    Comprehensive Metabolic Panel    Prostate Specific Antigen, Diagnostic    Estradiol    Testosterone      2. Prostate nodule        3. Combined arterial insufficiency and corporo-venous occlusive erectile dysfunction        4.  Polycythemia, secondary                    Plan:  Hypogonadism in male  -     CBC Without Differential; Future; Expected date: 12/27/2024  -     Comprehensive Metabolic Panel; Future; Expected date: 12/27/2024  -     Prostate Specific Antigen, Diagnostic; Future; Expected date: 12/27/2024  -     Estradiol; Future; Expected date: 12/27/2024  -     Testosterone; Future; Expected date: 12/27/2024    Prostate nodule    Combined arterial insufficiency and corporo-venous occlusive erectile dysfunction    Polycythemia, secondary    Other orders  -     testosterone cypionate (DEPOTESTOTERONE CYPIONATE) 200 mg/mL injection; Inject 0.8 mLs (160 mg total) into the muscle every 7 days.  Dispense: 4 mL; Refill: 5      Discussed need for Phlebotomy and orders sent to [facility]    Follow up in about 5 months (around 12/23/2024) for labs before visit.

## 2024-07-24 RX ORDER — TELMISARTAN AND HYDROCHLORTHIAZIDE 80; 25 MG/1; MG/1
1 TABLET ORAL DAILY
Qty: 30 TABLET | Refills: 6 | Status: SHIPPED | OUTPATIENT
Start: 2024-07-24 | End: 2025-07-24

## 2024-10-23 ENCOUNTER — OFFICE VISIT (OUTPATIENT)
Dept: INTERNAL MEDICINE | Facility: CLINIC | Age: 54
End: 2024-10-23
Payer: COMMERCIAL

## 2024-10-23 VITALS
BODY MASS INDEX: 34.47 KG/M2 | DIASTOLIC BLOOD PRESSURE: 80 MMHG | SYSTOLIC BLOOD PRESSURE: 128 MMHG | OXYGEN SATURATION: 97 % | WEIGHT: 261.25 LBS | HEART RATE: 83 BPM

## 2024-10-23 DIAGNOSIS — Z00.00 ROUTINE GENERAL MEDICAL EXAMINATION AT HEALTH CARE FACILITY: Primary | ICD-10-CM

## 2024-10-23 DIAGNOSIS — E29.1 HYPOGONADISM IN MALE: ICD-10-CM

## 2024-10-23 DIAGNOSIS — D75.1 POLYCYTHEMIA, SECONDARY: ICD-10-CM

## 2024-10-23 DIAGNOSIS — Z23 NEED FOR TDAP VACCINATION: ICD-10-CM

## 2024-10-23 DIAGNOSIS — I10 PRIMARY HYPERTENSION: ICD-10-CM

## 2024-10-23 PROBLEM — E66.01 SEVERE OBESITY (BMI 35.0-39.9) WITH COMORBIDITY: Status: RESOLVED | Noted: 2024-02-20 | Resolved: 2024-10-23

## 2024-10-23 PROCEDURE — 90471 IMMUNIZATION ADMIN: CPT | Mod: S$GLB,,, | Performed by: INTERNAL MEDICINE

## 2024-10-23 PROCEDURE — 99999 PR PBB SHADOW E&M-EST. PATIENT-LVL IV: CPT | Mod: PBBFAC,,, | Performed by: INTERNAL MEDICINE

## 2024-10-23 PROCEDURE — 99396 PREV VISIT EST AGE 40-64: CPT | Mod: 25,S$GLB,, | Performed by: INTERNAL MEDICINE

## 2024-10-23 PROCEDURE — 90715 TDAP VACCINE 7 YRS/> IM: CPT | Mod: S$GLB,,, | Performed by: INTERNAL MEDICINE

## 2024-10-23 NOTE — PROGRESS NOTES
Subjective:       Patient ID: Marcus Peterson is a 53 y.o. male.    Chief Complaint: follow up       HPI:  History of Present Illness    Patient presents today for routine follow-up.    He reports improved blood pressure management with a new medication regimen prescribed by his cardiologist. Current medications for blood pressure control include telmisartan (Mycardis), hydrochlorothiazide, nifedipine, and doxazosin. He states the new regimen has been working well.    He is currently on testosterone therapy. He acknowledges a recent elevation in his RBC count associated with the treatment. He has been undergoing blood draws for the past two weeks as ordered by his doctor to monitor this issue. He is aware of a scheduled follow-up lab to assess his hematocrit levels.    He reports a possible torn meniscus in his left knee. He is under the care of an orthopedic surgeon who believes the tear is minor and does not require surgical intervention. The treatment plan includes hyaluronic acid injection and physical therapy. He denies needing surgery at this time.    He reports no issues with bowel movements and denies blood in the stool. He attempts to increase fiber intake regularly.    He declines flu vaccine for today. He is unsure about the timing of his last tetanus vaccination and expresses interest in receiving a tetanus vaccine, acknowledging the importance of protection against tetanus.         Review of Systems   Constitutional:  Negative for fever and unexpected weight change.   HENT:  Negative for hearing loss, postnasal drip and rhinorrhea.    Eyes:  Negative for pain and visual disturbance.   Respiratory:  Negative for cough, shortness of breath and wheezing.    Cardiovascular:  Negative for chest pain and palpitations.   Gastrointestinal:  Negative for constipation, diarrhea, nausea and vomiting.   Genitourinary:  Negative for dysuria and hematuria.   Musculoskeletal:  Negative for arthralgias, back pain,  myalgias and neck stiffness.   Skin:  Negative for pallor and rash.   Neurological:  Negative for seizures, syncope and headaches.   Hematological:  Negative for adenopathy.   Psychiatric/Behavioral:  Negative for dysphoric mood. The patient is not nervous/anxious.        Objective:   /80   Pulse 83   Wt 118.5 kg (261 lb 3.9 oz)   SpO2 97%   BMI 34.47 kg/m²      Physical Exam  Vitals reviewed.   Constitutional:       General: He is not in acute distress.     Appearance: He is well-developed.   HENT:      Head: Normocephalic and atraumatic.      Right Ear: Tympanic membrane and ear canal normal.      Left Ear: Tympanic membrane and ear canal normal.   Eyes:      Pupils: Pupils are equal, round, and reactive to light.   Neck:      Thyroid: No thyromegaly.      Vascular: No JVD.   Cardiovascular:      Rate and Rhythm: Normal rate and regular rhythm.      Heart sounds: Normal heart sounds. No murmur heard.     No friction rub. No gallop.   Pulmonary:      Effort: Pulmonary effort is normal.      Breath sounds: Normal breath sounds. No wheezing or rales.   Abdominal:      General: Bowel sounds are normal. There is no distension.      Palpations: Abdomen is soft.      Tenderness: There is no abdominal tenderness. There is no guarding or rebound.   Musculoskeletal:         General: Normal range of motion.      Cervical back: Normal range of motion and neck supple.   Lymphadenopathy:      Cervical: No cervical adenopathy.   Skin:     General: Skin is warm and dry.      Findings: No rash.   Neurological:      General: No focal deficit present.      Mental Status: He is alert and oriented to person, place, and time.      Cranial Nerves: No cranial nerve deficit.      Deep Tendon Reflexes: Reflexes are normal and symmetric.   Psychiatric:         Mood and Affect: Mood normal.         Judgment: Judgment normal.         No visits with results within 2 Week(s) from this visit.   Latest known visit with results is:   Lab  Visit on 07/16/2024   Component Date Value    PSA Diagnostic 07/16/2024 3.0     Testosterone, Total 07/16/2024 464     Sodium 07/16/2024 141     Potassium 07/16/2024 3.4 (L)     Chloride 07/16/2024 104     CO2 07/16/2024 25     Glucose 07/16/2024 103     BUN 07/16/2024 16     Creatinine 07/16/2024 0.9     Calcium 07/16/2024 8.7     Total Protein 07/16/2024 7.1     Albumin 07/16/2024 4.0     Total Bilirubin 07/16/2024 1.2 (H)     Alkaline Phosphatase 07/16/2024 58     AST 07/16/2024 41 (H)     ALT 07/16/2024 85 (H)     eGFR 07/16/2024 >60.0     Anion Gap 07/16/2024 12     WBC 07/16/2024 4.57     RBC 07/16/2024 6.11     Hemoglobin 07/16/2024 18.4 (H)     Hematocrit 07/16/2024 54.6 (H)     MCV 07/16/2024 89     MCH 07/16/2024 30.1     MCHC 07/16/2024 33.7     RDW 07/16/2024 13.0     Platelets 07/16/2024 174     MPV 07/16/2024 11.7        Assessment:       1. Routine general medical examination at health care facility    2. Primary hypertension    3. Polycythemia, secondary    4. Need for Tdap vaccination    5. Hypogonadism in male        Plan:   Hypertension  Blood pressure is under good control.  We will continue the current regimen.  Will work on regular aerobic exercise and a low salt diet.        Hypogonadism in male  Continue following with Dr. Topete.  No changes today  Marcus was seen today for follow up .    Diagnoses and all orders for this visit:    Routine general medical examination at health care facility  Comments:  Focus on good health habits, low carb diet, seatbelt use, sunscreen use, regular exercise  Orders:  -     Lipid Panel; Future  -     Hemoglobin A1C; Future  -     Hepatitis C Antibody; Future    Primary hypertension    Polycythemia, secondary  Comments:  following Dr. Topete's recs on phelbotomy.  Keep monitoring.    Need for Tdap vaccination  -     Tdap (BOOSTRIX) vaccine injection 0.5 mL    Hypogonadism in male      Assessment & Plan    HYPERTENSION:  - Reviewed current medication regimen  for blood pressure management.  - Continued Mycardis (telmisartan), hydrochlorothiazide, nifedipine, and doxazosin for blood pressure.    TESTOSTERONE THERAPY:  - Noted recent elevation in RBC count associated with testosterone therapy, which Dr. Topete is monitoring.  - Explained potential risks associated with elevated RBC count, including increased blood viscosity and its impact on cardiovascular health.  - Clarified that baby aspirin would not address the viscosity issue related to elevated RBC count.  - Continued testosterone therapy.    SUSPECTED MENISCUS TEAR:  - Assessed recent orthopedic consultation for suspected meniscus tear in left knee, with planned hyaluronic acid injection and PT.    TETANUS VACCINATION:  - Administered tetanus vaccine.    HYPERLIPIDEMIA:  - Ordered fasting cholesterol lab test.    LABS:  - Scheduled fasting lab work for cholesterol on Friday.    OTHER INSTRUCTIONS:  - Evaluated overall health status and determined current treatment plan is appropriate.    FOLLOW UP:  - Follow up in about 1 year for annual follow-up, unless any health issues arise sooner.         Follow up in about 1 year (around 10/23/2025).             4-calculated by average/Not able to assess (calculate score using [facility] averaging method)

## 2024-10-25 ENCOUNTER — LAB VISIT (OUTPATIENT)
Dept: LAB | Facility: HOSPITAL | Age: 54
End: 2024-10-25
Attending: INTERNAL MEDICINE
Payer: COMMERCIAL

## 2024-10-25 DIAGNOSIS — Z00.00 ROUTINE GENERAL MEDICAL EXAMINATION AT HEALTH CARE FACILITY: ICD-10-CM

## 2024-10-25 LAB
CHOLEST SERPL-MCNC: 172 MG/DL (ref 120–199)
CHOLEST/HDLC SERPL: 4.6 {RATIO} (ref 2–5)
ESTIMATED AVG GLUCOSE: 91 MG/DL (ref 68–131)
HBA1C MFR BLD: 4.8 % (ref 4–5.6)
HCV AB SERPL QL IA: NORMAL
HDLC SERPL-MCNC: 37 MG/DL (ref 40–75)
HDLC SERPL: 21.5 % (ref 20–50)
LDLC SERPL CALC-MCNC: 118.6 MG/DL (ref 63–159)
NONHDLC SERPL-MCNC: 135 MG/DL
TRIGL SERPL-MCNC: 82 MG/DL (ref 30–150)

## 2024-10-25 PROCEDURE — 83036 HEMOGLOBIN GLYCOSYLATED A1C: CPT | Performed by: INTERNAL MEDICINE

## 2024-10-25 PROCEDURE — 86803 HEPATITIS C AB TEST: CPT | Performed by: INTERNAL MEDICINE

## 2024-10-25 PROCEDURE — 80061 LIPID PANEL: CPT | Performed by: INTERNAL MEDICINE

## 2024-10-25 PROCEDURE — 36415 COLL VENOUS BLD VENIPUNCTURE: CPT | Mod: PO | Performed by: INTERNAL MEDICINE

## 2024-11-04 RX ORDER — DOXAZOSIN 2 MG/1
2 TABLET ORAL NIGHTLY
Qty: 30 TABLET | Refills: 11 | Status: SHIPPED | OUTPATIENT
Start: 2024-11-04 | End: 2025-11-04

## 2024-11-15 ENCOUNTER — TELEPHONE (OUTPATIENT)
Dept: CARDIOLOGY | Facility: CLINIC | Age: 54
End: 2024-11-15
Payer: COMMERCIAL

## 2024-11-15 ENCOUNTER — HOSPITAL ENCOUNTER (OUTPATIENT)
Dept: CARDIOLOGY | Facility: HOSPITAL | Age: 54
Discharge: HOME | End: 2024-11-15
Attending: STUDENT IN AN ORGANIZED HEALTH CARE EDUCATION/TRAINING PROGRAM
Payer: COMMERCIAL

## 2024-11-15 ENCOUNTER — OFFICE VISIT (OUTPATIENT)
Dept: CARDIOLOGY | Facility: CLINIC | Age: 54
End: 2024-11-15
Payer: COMMERCIAL

## 2024-11-15 VITALS
HEART RATE: 78 BPM | SYSTOLIC BLOOD PRESSURE: 130 MMHG | WEIGHT: 260.56 LBS | DIASTOLIC BLOOD PRESSURE: 82 MMHG | BODY MASS INDEX: 34.53 KG/M2 | HEIGHT: 73 IN

## 2024-11-15 DIAGNOSIS — Z13.6 ENCOUNTER FOR SCREENING FOR CARDIOVASCULAR DISORDERS: ICD-10-CM

## 2024-11-15 DIAGNOSIS — I10 PRIMARY HYPERTENSION: Primary | ICD-10-CM

## 2024-11-15 DIAGNOSIS — I10 HYPERTENSION, UNSPECIFIED TYPE: Primary | ICD-10-CM

## 2024-11-15 DIAGNOSIS — I10 HYPERTENSION, UNSPECIFIED TYPE: ICD-10-CM

## 2024-11-15 DIAGNOSIS — E66.811 OBESITY (BMI 30.0-34.9): ICD-10-CM

## 2024-11-15 PROCEDURE — 93010 ELECTROCARDIOGRAM REPORT: CPT | Mod: ,,, | Performed by: INTERNAL MEDICINE

## 2024-11-15 PROCEDURE — 99999 PR PBB SHADOW E&M-EST. PATIENT-LVL III: CPT | Mod: PBBFAC,,, | Performed by: STUDENT IN AN ORGANIZED HEALTH CARE EDUCATION/TRAINING PROGRAM

## 2024-11-15 NOTE — PROGRESS NOTES
Section of Cardiology                  Cardiac Clinic Note    Chief Complaint/Reason for consultation: high blood pressure      HPI:   Marcus Peterson is a 53 y.o. male with h/o HTN, obesity who comes in to cardiology for evaluation.    24  Comes in with wife  Was seen in the ED 23 with dizziness, BP noted to be high   CTH negative   Was given meclizine, thought to be vertigo related- resolved   No chest pain, SOB  Exercising more now   Travels a lot for work, eats out a good bit  Eats snacks, not big on sweets   Lost weight to 238 lbs now 257 lbs   No longer on semiglutide (generic)    Says meds not controlling- compliant with taking  Does not take BP regularly at home  Metoprolol xl recently increased    ETOH- wine daily (1 bottle daily)  Denies tobacco abuse     Family hx: HTN- mom and dad  Mom-  of brain aneurysm, mom side of family of heart issues with aunt and uncle        24  BP stable   Watching diet better- portion size and ETOH reduction (about 2 nights a week now)  Weight going up since last visit  Has been stress eating and drinking lately due to having biopsy   Says hard to get HR up with exercise while taking bystolic   Was playing [employer] yesterday and felt winded and HR would not go above 60s     Denies chest pain, SOB, syncope       3/22/24  Weight is going up  Has been watching diet and exercising  Has swelling in legs since increase in nifedipine  Wants to get back on bystolic  Watching salt intake  Sailing in 1-2 weeks    Denies chest pain, SOB, syncope, PND      24  Doing well  Recently went to [place]  Losing weight 277->268 lbs  Watching diet  BP stable      Denies chest pain, SOB, syncope, PND      11/15/24  Weight down 17 lbs  Diet and exercise and pickle ball   BP stable   EKG with inferior infarct today   Denies symptoms     Denies chest pain, SOB, syncope, PND        EKG 11/15/24 NSR, inferior infarct   EKG 24 NSR, no acute ST  - T wave changes    ECHO  No results found for this or any previous visit.       STRESS TEST No results found for this or any previous visit.       LHC No results found for this or any previous visit.            ROS: All 10 systems reviewed. Please refer to the HPI for pertinent positives. All other systems negative.     Past Medical History  Past Medical History:   Diagnosis Date    Hypertension     Hypogonadism in male        Surgical History  Past Surgical History:   Procedure Laterality Date    COLONOSCOPY N/A 2022    Procedure: COLONOSCOPY;  Surgeon: Marcia Acevedo MD;  Location: [facility];  Service: Endoscopy;  Laterality: N/A;    EYE SURGERY      KNEE SURGERY            Allergies:   Review of patient's allergies indicates:  No Known Allergies    Social History:  Social History     Socioeconomic History    Marital status:     Number of children: 3   Occupational History    Occupation:    Tobacco Use    Smoking status: Former     Current packs/day: 0.00     Average packs/day: 0.5 packs/day for 5.0 years (2.5 ttl pk-yrs)     Types: Cigarettes     Start date:      Quit date:      Years since quittin.8    Smokeless tobacco: Never   Substance and Sexual Activity    Alcohol use: Yes     Alcohol/week: 10.0 standard drinks of alcohol     Types: 10 Glasses of wine per week    Drug use: Never    Sexual activity: Yes     Partners: Female       Family History:  family history includes Aneurysm in his mother; Arthritis in his mother; Cancer in his paternal grandmother; Depression in his mother; Hypertension in his father and mother; Muscular dystrophy in his paternal grandfather.    Home Medications:  Current Outpatient Medications on File Prior to Visit   Medication Sig Dispense Refill    doxazosin (CARDURA) 2 MG tablet TAKE 1 TABLET (2 MG TOTAL) BY MOUTH EVERY EVENING. 30 tablet 11    meclizine (ANTIVERT) 25 mg tablet Take 25 mg by mouth 3 (three) times daily as needed.       "needle, disp, 18 G (HYPODERMIC NEEDLES) 18 gauge x 1 1/2" Ndle Use as directed 12 each 4    needle, disp, 18 G 18 gauge x 1" Ndle Use as directed 4 each 12    needle, disp, 25 gauge 25 gauge x 1 1/2" Ndle Use as directed 12 each 4    NIFEdipine (PROCARDIA-XL) 60 MG (OSM) 24 hr tablet Take 1 tablet (60 mg total) by mouth once daily. 30 tablet 6    safety needles (ECLIPSE NEEDLE) 23 gauge x 1" Ndle Use as directed 4 each 12    syringe, disposable, 3 mL Syrg Use as directed 12 each 4    syringe, disposable, 3 mL Syrg Use as directed 12 each 4    tadalafiL (CIALIS) 20 MG Tab Take 1 po 1.5 hours before intercourse 60 tablet 7    telmisartan-hydrochlorothiazide (MICARDIS HCT) 80-25 mg per tablet TAKE 1 TABLET BY MOUTH ONCE DAILY. 30 tablet 6    testosterone cypionate (DEPOTESTOTERONE CYPIONATE) 200 mg/mL injection Inject 0.8 mLs (160 mg total) into the muscle every 7 days. 4 mL 5     No current facility-administered medications on file prior to visit.       Physical exam:  /82 (BP Location: Right arm, Patient Position: Sitting)   Pulse 78   Ht 6' 1" (1.854 m)   Wt 118.2 kg (260 lb 9.3 oz)   BMI 34.38 kg/m²         General: Pt is a 53 y.o. year old male who is AAOx3, in NAD, is pleasant, well nourished, looks stated age  HEENT: PERRL, EOMI, Oral mucosa pink & moist  CVS  No abnormal cardiac pulsations noted on inspection. JVP not raised. The apical impulse is normal on palpation, and is located in the left 5th intercostal space in the mid - clavicular line. No palpable thrills or abnormal pulsations noted. RR, S1 - S2 heard, no murmurs, rubs or gallops appreciated.   PUL : CTA B/L. No wheezes/crackles heard   ABD : BS +, soft. No tenderness elicited   LE : no leg swelling, Distal Pulses palpable B/L         LABS:    Chemistry:   Lab Results   Component Value Date     07/16/2024    K 3.4 (L) 07/16/2024     07/16/2024    CO2 25 07/16/2024    BUN 16 07/16/2024    CREATININE 0.9 07/16/2024    CALCIUM 8.7 " "07/16/2024     Cardiac Markers:   Lab Results   Component Value Date    TROPONINI 0.02 12/11/2023     Cardiac Markers (Last 3):   Lab Results   Component Value Date    TROPONINI 0.02 12/11/2023     CBC:   Lab Results   Component Value Date    WBC 4.57 07/16/2024    HGB 18.4 (H) 07/16/2024    HCT 54.6 (H) 07/16/2024    MCV 89 07/16/2024     07/16/2024     Lipids:   Lab Results   Component Value Date    CHOL 172 10/25/2024    TRIG 82 10/25/2024    HDL 37 (L) 10/25/2024     Coagulation: No results found for: "PT", "INR", "APTT"        Assessment    1. Primary hypertension    2. Obesity (BMI 30.0-34.9)    3. Encounter for screening for cardiovascular disorders        Plan:      Abnormal EKG  EKG with inferior infarct  Obtain CT calcium score    HTN  Stable   Continue telmisartan/hctz 80/25, nifedipine 60 daily (had swelling with BID),  cardura 2 mg at night    Obesity, Body mass index is 34.38 kg/m².   Low salt, low fat diet  Exercise as tolerated, at least 30 min daily     This note was prepared using voice recognition system and is likely to have sound alike errors that may have been overlooked even after proofreading.     I have reviewed all pertinent chart information.  Plans and recommendations have been formulated under my direct supervision. All questions answered and patient voiced understanding.   If symptoms persist go to the ED.    RTC arianna Steiner MD  Cardiology              "

## 2024-11-18 LAB
OHS QRS DURATION: 84 MS
OHS QTC CALCULATION: 437 MS

## 2024-11-19 ENCOUNTER — HOSPITAL ENCOUNTER (OUTPATIENT)
Dept: RADIOLOGY | Facility: HOSPITAL | Age: 54
Discharge: HOME | End: 2024-11-19
Attending: STUDENT IN AN ORGANIZED HEALTH CARE EDUCATION/TRAINING PROGRAM
Payer: COMMERCIAL

## 2024-11-19 DIAGNOSIS — Z13.6 ENCOUNTER FOR SCREENING FOR CARDIOVASCULAR DISORDERS: ICD-10-CM

## 2024-11-19 PROCEDURE — 75571 CT HRT W/O DYE W/CA TEST: CPT | Mod: TC,PN

## 2024-11-19 PROCEDURE — 75571 CT HRT W/O DYE W/CA TEST: CPT | Mod: 26,,, | Performed by: RADIOLOGY

## 2024-11-26 ENCOUNTER — PATIENT MESSAGE (OUTPATIENT)
Dept: CARDIOLOGY | Facility: CLINIC | Age: 54
End: 2024-11-26
Payer: COMMERCIAL

## 2024-11-26 ENCOUNTER — PATIENT MESSAGE (OUTPATIENT)
Dept: CARDIOLOGY | Facility: HOSPITAL | Age: 54
End: 2024-11-26
Payer: COMMERCIAL

## 2024-11-26 DIAGNOSIS — R93.1 AGATSTON CORONARY ARTERY CALCIUM SCORE GREATER THAN 400: Primary | ICD-10-CM

## 2024-11-26 RX ORDER — ASPIRIN 81 MG/1
81 TABLET ORAL DAILY
Qty: 90 TABLET | Refills: 3 | Status: SHIPPED | OUTPATIENT
Start: 2024-11-26 | End: 2025-11-26

## 2024-11-26 RX ORDER — ATORVASTATIN CALCIUM 40 MG/1
40 TABLET, FILM COATED ORAL DAILY
Qty: 90 TABLET | Refills: 3 | Status: SHIPPED | OUTPATIENT
Start: 2024-11-26 | End: 2025-11-26

## 2024-12-04 RX ORDER — NIFEDIPINE 60 MG/1
60 TABLET, EXTENDED RELEASE ORAL
Qty: 90 TABLET | Refills: 3 | Status: SHIPPED | OUTPATIENT
Start: 2024-12-04

## 2024-12-26 ENCOUNTER — PATIENT MESSAGE (OUTPATIENT)
Dept: CARDIOLOGY | Facility: HOSPITAL | Age: 54
End: 2024-12-26
Payer: COMMERCIAL

## 2025-01-07 ENCOUNTER — OFFICE VISIT (OUTPATIENT)
Dept: INTERNAL MEDICINE | Facility: CLINIC | Age: 55
End: 2025-01-07
Payer: COMMERCIAL

## 2025-01-07 DIAGNOSIS — M54.50 LOW BACK PAIN, UNSPECIFIED BACK PAIN LATERALITY, UNSPECIFIED CHRONICITY, UNSPECIFIED WHETHER SCIATICA PRESENT: Primary | ICD-10-CM

## 2025-01-07 RX ORDER — MELOXICAM 7.5 MG/1
7.5 TABLET ORAL 2 TIMES DAILY
Qty: 30 TABLET | Refills: 0 | Status: SHIPPED | OUTPATIENT
Start: 2025-01-07

## 2025-01-07 RX ORDER — CYCLOBENZAPRINE HCL 10 MG
10 TABLET ORAL 3 TIMES DAILY PRN
Qty: 30 TABLET | Refills: 0 | Status: SHIPPED | OUTPATIENT
Start: 2025-01-07 | End: 2025-01-17

## 2025-01-07 NOTE — PROGRESS NOTES
Subjective:       Patient ID: Marcus Peterson is a 54 y.o. male.  The patient location is: home  The chief complaint leading to consultation is: back pain    Visit type: audiovisual    Face to Face time with patient: 10  15 minutes of total time spent on the encounter, which includes face to face time and non-face to face time preparing to see the patient (eg, review of tests), Obtaining and/or reviewing separately obtained history, Documenting clinical information in the electronic or other health record, Independently interpreting results (not separately reported) and communicating results to the patient/family/caregiver, or Care coordination (not separately reported).         Each patient to whom he or she provides medical services by telemedicine is:  (1) informed of the relationship between the physician and patient and the respective role of any other health care provider with respect to management of the patient; and (2) notified that he or she may decline to receive medical services by telemedicine and may withdraw from such care at any time.    Notes:    CHIEF COMPLAINT:  - Mr. Peterson presents with severe lower back pain that occurred while getting into the shower.    HPI:  Mr. Peterson reports severe lower back pain that occurred while getting into the shower recently. The initial pain was extremely severe, rated as 10-12 on a scale of 1 to 10, but has since decreased to a level of 6 out of 10. This type of back pain has occurred before, a few years ago, but the current episode is more severe. The pain is localized to the lower back, affecting both sides, and significantly limits mobility. Mr. Peterson denies any radiation of pain down the legs or any bowel or bladder issues.    To manage the pain, the patient has been using a TENS unit, applying heat and ice (with ice noted to be helpful), and took a 40-minute sauna session followed by a cool pool dip. Mr. Peterson has also been taking OTC ibuprofen, with 3  tablets taken yesterday and 4 today, reporting minimal relief.    Mr. Peterson is currently working from home, which allows for some flexibility in managing the condition. He mentions being particularly busy this month due to his oldest daughter's upcoming wedding on February 1st.        There were no vitals taken for this visit.    Review of Systems   Constitutional:  Negative for fever.   Cardiovascular:  Negative for chest pain.   Gastrointestinal:  Negative for abdominal pain.   Genitourinary:  Negative for dysuria and hematuria.   Musculoskeletal:  Positive for back pain.   Neurological:  Negative for weakness, numbness and headaches.       Objective:      Physical Exam  Vitals and nursing note reviewed.   Constitutional:       General: He is not in acute distress.     Appearance: He is well-developed. He is not diaphoretic.   Cardiovascular:      Rate and Rhythm: Normal rate and regular rhythm.   Neurological:      Mental Status: He is alert and oriented to person, place, and time.   Psychiatric:         Behavior: Behavior normal.         Assessment and Plan        Marcus was seen today for back pain.    Diagnoses and all orders for this visit:    Low back pain, unspecified back pain laterality, unspecified chronicity, unspecified whether sciatica present  -     cyclobenzaprine (FLEXERIL) 10 MG tablet; Take 1 tablet (10 mg total) by mouth 3 (three) times daily as needed for Muscle spasms.  -     meloxicam (MOBIC) 7.5 MG tablet; Take 1 tablet (7.5 mg total) by mouth 2 (two) times a day.      There are no Patient Instructions on file for this visit.      1. Low back pain, unspecified back pain laterality, unspecified chronicity, unspecified whether sciatica present        Assessment & Plan    LOWER BACK PAIN:  - Assessed the patient's lower back pain, likely due to muscle strain or spasm.  - Pain is localized on both sides of the lower back, centered, with no radiation to legs and no bowel or bladder issues.  -  Prescribed prescription-strength anti-inflammatory medication to be taken once in the morning and once at night for 5 days, then switch to as-needed basis.  - Prescribed muscle relaxer to be taken up to 3 times daily as needed, cautioning about potential drowsiness.  - Discontinued OTC anti-inflammatories.  - Recommend Tylenol for additional pain relief if needed.  - Advised continuing conservative home remedies such as TENS unit, heat, ice, sauna, and stretching.  - Considered potential x-ray if symptoms persist by end of week.  - Explained red flag symptoms for back pain requiring emergency care (trouble urinating or defecating).    FOLLOW UP:  - Instructed the patient to contact the office if symptoms worsen or do not improve by end of the week.  - Advised to go to the emergency room immediately if back pain symptoms become severe or if trouble using the bathroom occurs.          This note was generated with the assistance of ambient listening technology. Verbal consent was obtained by the patient and accompanying visitor(s) for the recording of patient appointment to facilitate this note. I attest to having reviewed and edited the generated note for accuracy, though some syntax or spelling errors may persist. Please contact the author of this note for any clarification.

## 2025-02-04 ENCOUNTER — LAB VISIT (OUTPATIENT)
Dept: LAB | Facility: HOSPITAL | Age: 55
End: 2025-02-04
Attending: UROLOGY
Payer: COMMERCIAL

## 2025-02-04 ENCOUNTER — OFFICE VISIT (OUTPATIENT)
Dept: UROLOGY | Facility: CLINIC | Age: 55
End: 2025-02-04
Payer: COMMERCIAL

## 2025-02-04 VITALS
TEMPERATURE: 98 F | HEART RATE: 89 BPM | WEIGHT: 251.75 LBS | RESPIRATION RATE: 17 BRPM | DIASTOLIC BLOOD PRESSURE: 77 MMHG | HEIGHT: 73 IN | SYSTOLIC BLOOD PRESSURE: 146 MMHG | BODY MASS INDEX: 33.36 KG/M2

## 2025-02-04 DIAGNOSIS — E29.1 HYPOGONADISM IN MALE: Primary | ICD-10-CM

## 2025-02-04 DIAGNOSIS — D75.1 POLYCYTHEMIA, SECONDARY: ICD-10-CM

## 2025-02-04 DIAGNOSIS — N52.03 COMBINED ARTERIAL INSUFFICIENCY AND CORPORO-VENOUS OCCLUSIVE ERECTILE DYSFUNCTION: ICD-10-CM

## 2025-02-04 DIAGNOSIS — E29.1 HYPOGONADISM IN MALE: ICD-10-CM

## 2025-02-04 LAB
BILIRUB UR QL STRIP: NEGATIVE
GLUCOSE UR QL STRIP: NEGATIVE
KETONES UR QL STRIP: NEGATIVE
LEUKOCYTE ESTERASE UR QL STRIP: NEGATIVE
PH, POC UA: 7.5
POC BLOOD, URINE: NEGATIVE
POC NITRATES, URINE: NEGATIVE
PROT UR QL STRIP: NEGATIVE
SP GR UR STRIP: 1.02 (ref 1–1.03)
UROBILINOGEN UR STRIP-ACNC: 0.2 (ref 0.3–2.2)

## 2025-02-04 PROCEDURE — 82670 ASSAY OF TOTAL ESTRADIOL: CPT | Performed by: UROLOGY

## 2025-02-04 PROCEDURE — 36415 COLL VENOUS BLD VENIPUNCTURE: CPT | Mod: PN | Performed by: UROLOGY

## 2025-02-04 PROCEDURE — 99999 PR PBB SHADOW E&M-EST. PATIENT-LVL IV: CPT | Mod: PBBFAC,,, | Performed by: UROLOGY

## 2025-02-04 PROCEDURE — 80053 COMPREHEN METABOLIC PANEL: CPT | Performed by: UROLOGY

## 2025-02-04 PROCEDURE — 81003 URINALYSIS AUTO W/O SCOPE: CPT | Mod: QW,S$GLB,, | Performed by: UROLOGY

## 2025-02-04 PROCEDURE — 99214 OFFICE O/P EST MOD 30 MIN: CPT | Mod: S$GLB,,, | Performed by: UROLOGY

## 2025-02-04 PROCEDURE — 84153 ASSAY OF PSA TOTAL: CPT | Performed by: UROLOGY

## 2025-02-04 PROCEDURE — 84403 ASSAY OF TOTAL TESTOSTERONE: CPT | Performed by: UROLOGY

## 2025-02-04 PROCEDURE — 85027 COMPLETE CBC AUTOMATED: CPT | Performed by: UROLOGY

## 2025-02-04 RX ORDER — TESTOSTERONE CYPIONATE 200 MG/ML
160 INJECTION, SOLUTION INTRAMUSCULAR
Qty: 4 ML | Refills: 5 | Status: SHIPPED | OUTPATIENT
Start: 2025-02-04 | End: 2025-08-05

## 2025-02-04 RX ORDER — TADALAFIL 20 MG/1
TABLET ORAL
Qty: 60 TABLET | Refills: 7 | Status: SHIPPED | OUTPATIENT
Start: 2025-02-04

## 2025-02-04 NOTE — PROGRESS NOTES
CC: low T    History of Present Illness:     2/4/25-Injecting twice a week, Monday and Friday usually. Last injection was last Friday. Feels well. Energy is good Libido is good. Erections respond well to cialis. Donating blood.   7/23/24-Energy is fair. Libido is fair. No weak stream. No gross hematuria. Cialis 20mg works well.   2/20/24-TRUS biopsy path benign. Had some pretty significant hematospermia, which hasn't yet cleared. No issues otherwise. No fever. Took a testosterone injection yesterday.   2/7/24-TRUS biopsy today. 46cc prostate.   1/16/24-Energy is improved on testosterone. States that new BP meds have significantly affected his erections. He has taken cialis in the past and it has been helpful. Good stream. No hematuria or dysuria.   4/14/23-patient has run out of T and energy and T are low. Would like to restart. Has lost 40 pounds from Intermittent Fasting over the past several months. Libido is low. No bothersome LUTS.   10/14/22-52yo male here for evaluation of low T. Was previously on testosterone when he lived in [place]. Has been off for 8-9 months. Since that time, he reports that he has had a 30 pound fat gain. He has also started HTN meds. Now, he reports low libido. Has difficulty maintaining erections. Low energy. Stream is a little weaker and he has a little post-void dribble.   He was on T cypionate 200mg/mL 0.6mL Q7 days. Was on it for about 4 years.        Review of Systems   Constitutional:  Negative for chills and fever.   Respiratory:  Negative for shortness of breath.    Cardiovascular:  Negative for chest pain.   Gastrointestinal:  Negative for abdominal pain.   All other systems reviewed and are negative.        Past Medical History:   Diagnosis Date    Hypertension     Hypogonadism in male        Past Surgical History:   Procedure Laterality Date    COLONOSCOPY N/A 09/16/2022    Procedure: COLONOSCOPY;  Surgeon: Marcia Acevedo MD;  Location: [facility];  Service: Endoscopy;   "Laterality: N/A;    EYE SURGERY      KNEE SURGERY         Family History   Problem Relation Name Age of Onset    Arthritis Mother      Depression Mother      Hypertension Mother      Aneurysm Mother      Hypertension Father      Cancer Paternal Grandmother      Muscular dystrophy Paternal Grandfather         Social History     Tobacco Use    Smoking status: Former     Current packs/day: 0.00     Average packs/day: 0.5 packs/day for 5.0 years (2.5 ttl pk-yrs)     Types: Cigarettes     Start date:      Quit date:      Years since quittin.1    Smokeless tobacco: Never   Substance Use Topics    Alcohol use: Yes     Alcohol/week: 10.0 standard drinks of alcohol     Types: 10 Glasses of wine per week    Drug use: Never       Current Outpatient Medications   Medication Sig Dispense Refill    aspirin (ECOTRIN) 81 MG EC tablet Take 1 tablet (81 mg total) by mouth once daily. 90 tablet 3    atorvastatin (LIPITOR) 40 MG tablet Take 1 tablet (40 mg total) by mouth once daily. 90 tablet 3    doxazosin (CARDURA) 2 MG tablet TAKE 1 TABLET (2 MG TOTAL) BY MOUTH EVERY EVENING. 30 tablet 11    meclizine (ANTIVERT) 25 mg tablet Take 25 mg by mouth 3 (three) times daily as needed.      meloxicam (MOBIC) 7.5 MG tablet Take 1 tablet (7.5 mg total) by mouth 2 (two) times a day. 30 tablet 0    needle, disp, 18 G (HYPODERMIC NEEDLES) 18 gauge x 1 1/2" Ndle Use as directed 12 each 4    needle, disp, 18 G 18 gauge x 1" Ndle Use as directed 4 each 12    needle, disp, 25 gauge 25 gauge x 1 1/2" Ndle Use as directed 12 each 4    NIFEdipine (PROCARDIA-XL) 60 MG (OSM) 24 hr tablet TAKE 1 TABLET BY MOUTH ONCE DAILY. 90 tablet 3    safety needles (ECLIPSE NEEDLE) 23 gauge x 1" Ndle Use as directed 4 each 12    syringe, disposable, 3 mL Syrg Use as directed 12 each 4    syringe, disposable, 3 mL Syrg Use as directed 12 each 4    telmisartan-hydrochlorothiazide (MICARDIS HCT) 80-25 mg per tablet TAKE 1 TABLET BY MOUTH ONCE DAILY. 30 " tablet 6    tadalafiL (CIALIS) 20 MG Tab Take 1 po 1.5 hours before intercourse 60 tablet 7    testosterone cypionate (DEPOTESTOTERONE CYPIONATE) 200 mg/mL injection Inject 0.8 mLs (160 mg total) into the muscle every 7 days. 4 mL 5     No current facility-administered medications for this visit.       Review of patient's allergies indicates:  No Known Allergies    Physical Exam  Vitals:    02/04/25 1411   BP: (!) 146/77   Pulse: 89   Resp: 17   Temp: 98.1 °F (36.7 °C)         General: Well-developed, well-nourished in no acute distress  HEENT: Normocephalic, atraumatic, Extraocular movements intact  Neck: supple, trachea midline, no cervical or supraclavicular lymphadenopathy  Respirations: even and unlabored  Back: midline spine, no CVA tenderness  : 10/14/22-circumcised male phallus without lesions, orthotopic urethral meatus, no inguinal hernia, no inguinal lymphadenopathy, no scrotal lesions, testicles descended bilaterally without mass or tenderness  Rectal: 1/16/24-30g prostate, nodule at the left apex, non-tender  Extremities: atraumatic, moves all equally, no clubbing, cyanosis or edema  Psych: normal affect  Skin: warm and dry, no lesions  Neuro: Alert and oriented, Cranial nerves II-XII grossly intact    Testosterone:   4/10/23: 397   10/6/23: 827  7/16/24: 464    PSA  10/6/23: 2.5  7/16/24: 3.0    Lab Results   Component Value Date    ALT 85 (H) 07/16/2024    AST 41 (H) 07/16/2024    ALKPHOS 58 07/16/2024    BILITOT 1.2 (H) 07/16/2024     Lab Results   Component Value Date    WBC 4.57 07/16/2024    HGB 18.4 (H) 07/16/2024    HCT 54.6 (H) 07/16/2024    MCV 89 07/16/2024     07/16/2024         Assessment:   1. Hypogonadism in male  POCT Urinalysis, Dipstick, Automated, W/O Scope    testosterone cypionate (DEPOTESTOTERONE CYPIONATE) 200 mg/mL injection    Prostate Specific Antigen, Diagnostic    Testosterone    Estradiol    Comprehensive Metabolic Panel    CBC Without Differential    Comprehensive  Metabolic Panel    Estradiol    Prostate Specific Antigen, Diagnostic    Testosterone      2. Polycythemia, secondary  CBC Without Differential      3. Combined arterial insufficiency and corporo-venous occlusive erectile dysfunction                    Plan:  Hypogonadism in male  -     POCT Urinalysis, Dipstick, Automated, W/O Scope  -     testosterone cypionate (DEPOTESTOTERONE CYPIONATE) 200 mg/mL injection; Inject 0.8 mLs (160 mg total) into the muscle every 7 days.  Dispense: 4 mL; Refill: 5  -     Prostate Specific Antigen, Diagnostic; Future; Expected date: 02/04/2025  -     Testosterone; Future; Expected date: 02/04/2025  -     Estradiol; Future; Expected date: 02/04/2025  -     Comprehensive Metabolic Panel; Future; Expected date: 02/04/2025  -     CBC Without Differential; Future; Expected date: 08/04/2025  -     Comprehensive Metabolic Panel; Future; Expected date: 08/04/2025  -     Estradiol; Future; Expected date: 08/04/2025  -     Prostate Specific Antigen, Diagnostic; Future; Expected date: 08/04/2025  -     Testosterone; Future; Expected date: 08/04/2025    Polycythemia, secondary  -     CBC Without Differential; Future; Expected date: 02/04/2025    Combined arterial insufficiency and corporo-venous occlusive erectile dysfunction    Other orders  -     tadalafiL (CIALIS) 20 MG Tab; Take 1 po 1.5 hours before intercourse  Dispense: 60 tablet; Refill: 7    Pt to complete labs today. Will adjust trt accordingly  Recommended phlebotomy quarterly.       Follow up in about 6 months (around 8/4/2025) for labs before visit.

## 2025-02-05 LAB
ALBUMIN SERPL BCP-MCNC: 4.1 G/DL (ref 3.5–5.2)
ALP SERPL-CCNC: 75 U/L (ref 40–150)
ALT SERPL W/O P-5'-P-CCNC: 62 U/L (ref 10–44)
ANION GAP SERPL CALC-SCNC: 12 MMOL/L (ref 8–16)
AST SERPL-CCNC: 44 U/L (ref 10–40)
BILIRUB SERPL-MCNC: 0.7 MG/DL (ref 0.1–1)
BUN SERPL-MCNC: 17 MG/DL (ref 6–20)
CALCIUM SERPL-MCNC: 9.1 MG/DL (ref 8.7–10.5)
CHLORIDE SERPL-SCNC: 98 MMOL/L (ref 95–110)
CO2 SERPL-SCNC: 27 MMOL/L (ref 23–29)
COMPLEXED PSA SERPL-MCNC: 2.6 NG/ML (ref 0–4)
CREAT SERPL-MCNC: 1.3 MG/DL (ref 0.5–1.4)
ERYTHROCYTE [DISTWIDTH] IN BLOOD BY AUTOMATED COUNT: 14.1 % (ref 11.5–14.5)
EST. GFR  (NO RACE VARIABLE): >60 ML/MIN/1.73 M^2
ESTRADIOL SERPL-MCNC: 39 PG/ML (ref 11–44)
GLUCOSE SERPL-MCNC: 86 MG/DL (ref 70–110)
HCT VFR BLD AUTO: 57.3 % (ref 40–54)
HGB BLD-MCNC: 18 G/DL (ref 14–18)
MCH RBC QN AUTO: 28.5 PG (ref 27–31)
MCHC RBC AUTO-ENTMCNC: 31.4 G/DL (ref 32–36)
MCV RBC AUTO: 91 FL (ref 82–98)
PLATELET # BLD AUTO: 191 K/UL (ref 150–450)
PMV BLD AUTO: 12.4 FL (ref 9.2–12.9)
POTASSIUM SERPL-SCNC: 4.1 MMOL/L (ref 3.5–5.1)
PROT SERPL-MCNC: 7.4 G/DL (ref 6–8.4)
RBC # BLD AUTO: 6.31 M/UL (ref 4.6–6.2)
SODIUM SERPL-SCNC: 137 MMOL/L (ref 136–145)
TESTOST SERPL-MCNC: 864 NG/DL (ref 304–1227)
WBC # BLD AUTO: 9.17 K/UL (ref 3.9–12.7)

## 2025-03-04 ENCOUNTER — PATIENT MESSAGE (OUTPATIENT)
Dept: INTERNAL MEDICINE | Facility: CLINIC | Age: 55
End: 2025-03-04
Payer: COMMERCIAL

## 2025-03-04 RX ORDER — TELMISARTAN AND HYDROCHLORTHIAZIDE 80; 25 MG/1; MG/1
1 TABLET ORAL DAILY
Qty: 30 TABLET | Refills: 6 | Status: SHIPPED | OUTPATIENT
Start: 2025-03-04 | End: 2026-03-04

## 2025-03-04 NOTE — TELEPHONE ENCOUNTER
Pt states Dr. Steiner is no longer at [facility]. Pt needs telmisartan/hct refilled    Lv 1/7/25  Fu none

## 2025-05-28 ENCOUNTER — PATIENT MESSAGE (OUTPATIENT)
Dept: INTERNAL MEDICINE | Facility: CLINIC | Age: 55
End: 2025-05-28
Payer: COMMERCIAL

## 2025-05-28 DIAGNOSIS — I10 PRIMARY HYPERTENSION: Primary | ICD-10-CM

## 2025-05-28 NOTE — TELEPHONE ENCOUNTER
Pt would like an external referral to [facility] since Dr. Steiner left.    Fax number [phone]

## 2025-07-28 ENCOUNTER — PATIENT MESSAGE (OUTPATIENT)
Dept: UROLOGY | Facility: CLINIC | Age: 55
End: 2025-07-28
Payer: COMMERCIAL

## 2025-07-29 ENCOUNTER — LAB VISIT (OUTPATIENT)
Dept: LAB | Facility: HOSPITAL | Age: 55
End: 2025-07-29
Attending: UROLOGY
Payer: COMMERCIAL

## 2025-07-29 DIAGNOSIS — E29.1 HYPOGONADISM IN MALE: ICD-10-CM

## 2025-07-29 LAB
ALBUMIN SERPL BCP-MCNC: 4.4 G/DL (ref 3.5–5.2)
ALP SERPL-CCNC: 58 UNIT/L (ref 40–150)
ALT SERPL W/O P-5'-P-CCNC: 64 UNIT/L (ref 0–55)
ANION GAP (OHS): 12 MMOL/L (ref 8–16)
AST SERPL-CCNC: 52 UNIT/L (ref 0–50)
BILIRUB SERPL-MCNC: 0.8 MG/DL (ref 0.1–1)
BUN SERPL-MCNC: 16 MG/DL (ref 6–20)
CALCIUM SERPL-MCNC: 8.5 MG/DL (ref 8.7–10.5)
CHLORIDE SERPL-SCNC: 103 MMOL/L (ref 95–110)
CO2 SERPL-SCNC: 27 MMOL/L (ref 23–29)
CREAT SERPL-MCNC: 1.2 MG/DL (ref 0.5–1.4)
ERYTHROCYTE [DISTWIDTH] IN BLOOD BY AUTOMATED COUNT: 12.6 % (ref 11.5–14.5)
ESTRADIOL SERPL HS-MCNC: <10 PG/ML (ref 11–44)
GFR SERPLBLD CREATININE-BSD FMLA CKD-EPI: >60 ML/MIN/1.73/M2
GLUCOSE SERPL-MCNC: 117 MG/DL (ref 70–110)
HCT VFR BLD AUTO: 55.7 % (ref 40–54)
HGB BLD-MCNC: 18.6 GM/DL (ref 14–18)
MCH RBC QN AUTO: 29.8 PG (ref 27–31)
MCHC RBC AUTO-ENTMCNC: 33.4 G/DL (ref 32–36)
MCV RBC AUTO: 89 FL (ref 82–98)
PLATELET # BLD AUTO: 161 K/UL (ref 150–450)
PMV BLD AUTO: 12 FL (ref 9.2–12.9)
POTASSIUM SERPL-SCNC: 3.7 MMOL/L (ref 3.5–5.1)
PROT SERPL-MCNC: 7.4 GM/DL (ref 6–8.4)
PSA SERPL-MCNC: 2.83 NG/ML
RBC # BLD AUTO: 6.25 M/UL (ref 4.6–6.2)
SODIUM SERPL-SCNC: 142 MMOL/L (ref 136–145)
TESTOST SERPL-MCNC: 497 NG/DL (ref 304–1227)
WBC # BLD AUTO: 5.48 K/UL (ref 3.9–12.7)

## 2025-07-29 PROCEDURE — 36415 COLL VENOUS BLD VENIPUNCTURE: CPT | Mod: PN

## 2025-07-29 PROCEDURE — 85027 COMPLETE CBC AUTOMATED: CPT

## 2025-07-29 PROCEDURE — 82040 ASSAY OF SERUM ALBUMIN: CPT

## 2025-07-29 PROCEDURE — 84153 ASSAY OF PSA TOTAL: CPT

## 2025-07-29 PROCEDURE — 84403 ASSAY OF TOTAL TESTOSTERONE: CPT

## 2025-07-29 PROCEDURE — 82670 ASSAY OF TOTAL ESTRADIOL: CPT

## 2025-08-11 ENCOUNTER — PATIENT MESSAGE (OUTPATIENT)
Dept: INTERNAL MEDICINE | Facility: CLINIC | Age: 55
End: 2025-08-11
Payer: COMMERCIAL

## 2025-08-12 ENCOUNTER — OFFICE VISIT (OUTPATIENT)
Dept: UROLOGY | Facility: CLINIC | Age: 55
End: 2025-08-12
Payer: COMMERCIAL

## 2025-08-12 VITALS
RESPIRATION RATE: 17 BRPM | SYSTOLIC BLOOD PRESSURE: 146 MMHG | BODY MASS INDEX: 35.53 KG/M2 | TEMPERATURE: 98 F | WEIGHT: 268.06 LBS | HEIGHT: 73 IN | DIASTOLIC BLOOD PRESSURE: 85 MMHG | HEART RATE: 79 BPM

## 2025-08-12 DIAGNOSIS — N52.03 COMBINED ARTERIAL INSUFFICIENCY AND CORPORO-VENOUS OCCLUSIVE ERECTILE DYSFUNCTION: ICD-10-CM

## 2025-08-12 DIAGNOSIS — D75.1 POLYCYTHEMIA, SECONDARY: ICD-10-CM

## 2025-08-12 DIAGNOSIS — N40.2 PROSTATE NODULE: ICD-10-CM

## 2025-08-12 DIAGNOSIS — E29.1 HYPOGONADISM IN MALE: Primary | ICD-10-CM

## 2025-08-12 LAB
BILIRUBIN, UA POC OHS: NEGATIVE
BLOOD, UA POC OHS: NEGATIVE
CLARITY, UA POC OHS: CLEAR
COLOR, UA POC OHS: YELLOW
GLUCOSE, UA POC OHS: NEGATIVE
KETONES, UA POC OHS: NEGATIVE
LEUKOCYTES, UA POC OHS: NEGATIVE
NITRITE, UA POC OHS: NEGATIVE
PH, UA POC OHS: 6.5
PROTEIN, UA POC OHS: NEGATIVE
SPECIFIC GRAVITY, UA POC OHS: 1.01
UROBILINOGEN, UA POC OHS: 1

## 2025-08-12 PROCEDURE — 99999 PR PBB SHADOW E&M-EST. PATIENT-LVL III: CPT | Mod: PBBFAC,,, | Performed by: UROLOGY

## 2025-08-12 PROCEDURE — 81003 URINALYSIS AUTO W/O SCOPE: CPT | Mod: QW,S$GLB,, | Performed by: UROLOGY

## 2025-08-12 PROCEDURE — 99214 OFFICE O/P EST MOD 30 MIN: CPT | Mod: S$GLB,,, | Performed by: UROLOGY

## 2025-08-12 RX ORDER — TADALAFIL 20 MG/1
TABLET ORAL
Qty: 60 TABLET | Refills: 7 | Status: SHIPPED | OUTPATIENT
Start: 2025-08-12

## 2025-08-12 RX ORDER — TESTOSTERONE CYPIONATE 200 MG/ML
160 INJECTION, SOLUTION INTRAMUSCULAR
Qty: 4 ML | Refills: 5 | Status: SHIPPED | OUTPATIENT
Start: 2025-08-12 | End: 2026-02-10